# Patient Record
Sex: FEMALE | Race: WHITE | NOT HISPANIC OR LATINO | Employment: PART TIME | URBAN - METROPOLITAN AREA
[De-identification: names, ages, dates, MRNs, and addresses within clinical notes are randomized per-mention and may not be internally consistent; named-entity substitution may affect disease eponyms.]

---

## 2021-06-18 ENCOUNTER — APPOINTMENT (OUTPATIENT)
Dept: LAB | Facility: MEDICAL CENTER | Age: 56
End: 2021-06-18
Payer: COMMERCIAL

## 2021-06-18 DIAGNOSIS — Z00.00 ROUTINE GENERAL MEDICAL EXAMINATION AT A HEALTH CARE FACILITY: ICD-10-CM

## 2021-06-18 LAB — RUBV IGG SERPL IA-ACNC: >175 IU/ML

## 2021-06-18 PROCEDURE — 86735 MUMPS ANTIBODY: CPT

## 2021-06-18 PROCEDURE — 86787 VARICELLA-ZOSTER ANTIBODY: CPT

## 2021-06-18 PROCEDURE — 86762 RUBELLA ANTIBODY: CPT

## 2021-06-18 PROCEDURE — 36415 COLL VENOUS BLD VENIPUNCTURE: CPT

## 2021-06-18 PROCEDURE — 86765 RUBEOLA ANTIBODY: CPT

## 2021-06-19 LAB — RUBV IGM SER IA-ACNC: <20 AU/ML (ref 0–19.9)

## 2021-06-21 LAB
MEV IGM SER-ACNC: <0.91 ISR (ref 0–0.9)
VZV IGM SER IA-ACNC: <0.91 INDEX (ref 0–0.9)

## 2021-06-22 LAB
MEV IGG SER QL: NORMAL
MUV IGG SER QL: NORMAL
MUV IGM SER QL: <0.8 AU (ref 0–0.79)
VZV IGG SER IA-ACNC: NORMAL

## 2021-06-26 ENCOUNTER — APPOINTMENT (OUTPATIENT)
Dept: LAB | Facility: MEDICAL CENTER | Age: 56
End: 2021-06-26
Payer: COMMERCIAL

## 2021-06-26 DIAGNOSIS — Z00.8 HEALTH EXAMINATION IN POPULATION SURVEY: ICD-10-CM

## 2021-06-26 LAB — HBV SURFACE AB SER-ACNC: <3.1 MIU/ML

## 2021-06-26 PROCEDURE — 36415 COLL VENOUS BLD VENIPUNCTURE: CPT

## 2021-06-26 PROCEDURE — 86706 HEP B SURFACE ANTIBODY: CPT

## 2021-12-08 ENCOUNTER — APPOINTMENT (OUTPATIENT)
Dept: URGENT CARE | Facility: CLINIC | Age: 56
End: 2021-12-08

## 2021-12-08 ENCOUNTER — APPOINTMENT (OUTPATIENT)
Dept: LAB | Facility: CLINIC | Age: 56
End: 2021-12-08

## 2021-12-08 DIAGNOSIS — Z02.1 PHYSICAL EXAM, PRE-EMPLOYMENT: Primary | ICD-10-CM

## 2021-12-08 PROCEDURE — 36415 COLL VENOUS BLD VENIPUNCTURE: CPT

## 2021-12-08 PROCEDURE — 86480 TB TEST CELL IMMUN MEASURE: CPT

## 2021-12-10 LAB
GAMMA INTERFERON BACKGROUND BLD IA-ACNC: 0.04 IU/ML
M TB IFN-G BLD-IMP: NEGATIVE
M TB IFN-G CD4+ BCKGRND COR BLD-ACNC: 0 IU/ML
M TB IFN-G CD4+ BCKGRND COR BLD-ACNC: 0 IU/ML
MITOGEN IGNF BCKGRD COR BLD-ACNC: 9.28 IU/ML

## 2022-01-11 ENCOUNTER — CLINICAL SUPPORT (OUTPATIENT)
Dept: FAMILY MEDICINE CLINIC | Facility: MEDICAL CENTER | Age: 57
End: 2022-01-11

## 2022-01-11 DIAGNOSIS — Z20.822 EXPOSURE TO COVID-19 VIRUS: Primary | ICD-10-CM

## 2022-01-11 LAB
SARS-COV-2 AG UPPER RESP QL IA: POSITIVE
VALID CONTROL: ABNORMAL

## 2022-01-11 PROCEDURE — NC001 PR NO CHARGE

## 2022-01-11 NOTE — PROGRESS NOTES
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  Patient came to the office today for a rapid covid swab  The result was positive  The patient was made aware of the results and advised that the patient will need to isolate for a minimum of 5 days from symptom onset  Patient may discontinue isolation after 5 days if they are fever free for 24 hours without the need for medication and if they have symptom improvement for 24 hours  There will however be mandatory masking in public and at work for the next 5 days following isolation discontinuation  Daily temperature checks are recommended  If chest pain or shortness of breath develops patient should go to the emergency room  Patient is agreeable and understands instructions  Will call back when they are feeling better to set up a new patient appointment to establish with Dr Jennifer Porter

## 2022-11-09 ENCOUNTER — APPOINTMENT (OUTPATIENT)
Dept: RADIOLOGY | Facility: MEDICAL CENTER | Age: 57
End: 2022-11-09

## 2022-11-09 ENCOUNTER — OFFICE VISIT (OUTPATIENT)
Dept: URGENT CARE | Facility: MEDICAL CENTER | Age: 57
End: 2022-11-09

## 2022-11-09 ENCOUNTER — TELEPHONE (OUTPATIENT)
Dept: OBGYN CLINIC | Facility: HOSPITAL | Age: 57
End: 2022-11-09

## 2022-11-09 VITALS
TEMPERATURE: 97.6 F | WEIGHT: 126.4 LBS | RESPIRATION RATE: 18 BRPM | HEIGHT: 63 IN | HEART RATE: 76 BPM | SYSTOLIC BLOOD PRESSURE: 145 MMHG | OXYGEN SATURATION: 99 % | DIASTOLIC BLOOD PRESSURE: 89 MMHG | BODY MASS INDEX: 22.39 KG/M2

## 2022-11-09 DIAGNOSIS — S69.92XA HAND INJURY, LEFT, INITIAL ENCOUNTER: ICD-10-CM

## 2022-11-09 DIAGNOSIS — S62.639A CLOSED AVULSION FRACTURE OF DISTAL PHALANX OF FINGER, INITIAL ENCOUNTER: Primary | ICD-10-CM

## 2022-11-09 NOTE — PROGRESS NOTES
[facility]        NAME: Bri Williamson is a 61 y o  female  : 1965    MRN: [id number]  DATE: 2022  TIME: 11:29 AM    Assessment and Orders   Closed avulsion fracture of distal phalanx of finger, initial encounter [L49 [id number]]  1  Closed avulsion fracture of distal phalanx of finger, initial encounter  Ambulatory Referral to Orthopedic Surgery   2  Hand injury, left, initial encounter  XR hand 3+ vw left         Plan and Discussion      Symptoms and exam consistent with avulsion fracture of the 5th digit on the left hand  Finger splinted in neutral position  Referral made to Orthopedics for follow-up  Risks and benefits discussed  Patient understands and agrees with the plan  Follow up with PCP  Chief Complaint     Chief Complaint   Patient presents with   • Hand Injury     Pt slammed her finger off a table while wrestling her dog x7 days ago         History of Present Illness       HPI     Patient is a 76-year-old female who presents with left 5th digit pain  She states that approximately 9 days ago, she was wrestling with a dog and she hit her left hand on a table  She states that her finger was corrected initially but she was able to reduce it herself immediately  Since then, she has kept it splinted but has had continued pain  Today she is here for evaluation and x-ray  Review of Systems   Review of Systems   Musculoskeletal: Positive for joint swelling  Skin: Negative for color change  Neurological: Negative for weakness, light-headedness and numbness  Current Medications     No current outpatient medications on file      Current Allergies     Allergies as of 2022 - Reviewed 2022   Allergen Reaction Noted   • Sulfa antibiotics Hives 2022            The following portions of the patient's history were reviewed and updated as appropriate: allergies, current medications, past family history, past medical history, past social history, past surgical history and problem list      History reviewed  No pertinent past medical history  Past Surgical History:   Procedure Laterality Date   • TENDON REPAIR         No family history on file  Medications have been verified  Objective   /89 (BP Location: Left arm)   Pulse 76   Temp 97 6 °F (36 4 °C) (Temporal)   Resp 18   Ht 5' 3" (1 6 m)   Wt 57 3 kg (126 lb 6 4 oz)   SpO2 99%   BMI 22 39 kg/m²   No LMP recorded  Patient is postmenopausal        Physical Exam     Physical Exam  Constitutional:       General: She is not in acute distress  Appearance: She is not ill-appearing  HENT:      Head: Normocephalic and atraumatic  Right Ear: External ear normal       Left Ear: External ear normal       Nose: Nose normal    Cardiovascular:      Rate and Rhythm: Normal rate and regular rhythm  Pulmonary:      Effort: Pulmonary effort is normal  No respiratory distress  Musculoskeletal:        Hands:    Skin:     General: Skin is warm and dry  Neurological:      General: No focal deficit present  Mental Status: She is alert and oriented to person, place, and time  Psychiatric:         Mood and Affect: Mood normal          Behavior: Behavior normal          Thought Content:  Thought content normal          Judgment: Judgment normal                Saida Lam DO

## 2022-11-09 NOTE — TELEPHONE ENCOUNTER
Hello,  Please advise if the following patient can be forced onto the schedule:    Pa Marcos    : 63    DAVID:[id number]    Call back #: [phone]    Insurance: [employer]    Reason for appointment:Closed avulsion fracture of distal phalanx of finger    Requested doctor/location: [place]      Thank you

## 2022-11-16 ENCOUNTER — OFFICE VISIT (OUTPATIENT)
Dept: OBGYN CLINIC | Facility: CLINIC | Age: 57
End: 2022-11-16

## 2022-11-16 VITALS
SYSTOLIC BLOOD PRESSURE: 110 MMHG | BODY MASS INDEX: 22.86 KG/M2 | HEIGHT: 63 IN | WEIGHT: 129 LBS | DIASTOLIC BLOOD PRESSURE: 73 MMHG | HEART RATE: 70 BPM

## 2022-11-16 DIAGNOSIS — S62.639A CLOSED AVULSION FRACTURE OF DISTAL PHALANX OF FINGER, INITIAL ENCOUNTER: ICD-10-CM

## 2022-11-16 NOTE — PROGRESS NOTES
Assessment/Plan:  Assessment/Plan   Diagnoses and all orders for this visit:    Closed avulsion fracture of distal phalanx of finger, initial encounter  -     Ambulatory Referral to Orthopedic Surgery      50-year-old female with onset of left little finger pain and swelling from injury on 11/02/2022  Discussed with patient physical exam, radiographs, impression and plan  X-rays left hand noted for displaced fracture dorsal base of the 5th distal phalanx  Physical exam left little finger noted for swelling at the dorsum DIP joint  She is tenderness at the joint  She is flexion at the joint limited to 30° and she has active extension at the joint to -5°  There is no appreciable collateral ligament laxity  She is intact neurovascularly  Clinical impression is that she is symptomatic from acute fracture to the distal phalanx  I discussed with patient that she has intact extensor mechanism so we may do conservative management  I recommend she continue with static splint immobilization for another 2 weeks  She will follow up in 2 weeks at which point we will repeat x-rays of left little finger and she will be re-evaluated  Subjective:   Patient ID: Freya Buenrostro is a 61 y o  female  Chief Complaint   Patient presents with   • Left Little Finger - Pain, Swelling       50-year-old female presents for evaluation of left little finger pain from injury on 11/02/2022  She was playing with her dog when she thinks she may have struck her hand against a table  She would pain described as sudden in onset, localized to the distal aspect the finger, non radiating, associated with swelling, and worse with moving  She presented to Urgent Care about 1 week later at which point x-ray evaluation was noted for fracture of the distal phalanx  She was placed in aluminum splint and advised to follow up with orthopedic care    She reports that while she is in resting position she is little to no pain, but upon palpation of distal aspect finger there is pain  Hand Pain  This is a new problem  The current episode started 1 to 4 weeks ago  The problem occurs daily  The problem has been gradually improving  Associated symptoms include arthralgias and joint swelling  Pertinent negatives include no abdominal pain, chest pain, chills, fever, numbness, rash, sore throat or weakness  Exacerbated by: Direct pressure  She has tried rest and immobilization for the symptoms  The treatment provided mild relief  The following portions of the patient's history were reviewed and updated as appropriate: She  has no past medical history on file  She  has a past surgical history that includes Tendon repair  Her family history is not on file  She  reports that she has never smoked  She has never used smokeless tobacco  No history on file for alcohol use and drug use  She is allergic to sulfa antibiotics       Review of Systems   Constitutional: Negative for chills and fever  HENT: Negative for sore throat  Eyes: Negative for visual disturbance  Respiratory: Negative for shortness of breath  Cardiovascular: Negative for chest pain  Gastrointestinal: Negative for abdominal pain  Genitourinary: Negative for flank pain  Musculoskeletal: Positive for arthralgias and joint swelling  Skin: Negative for rash and wound  Neurological: Negative for weakness and numbness  Hematological: Does not bruise/bleed easily  Psychiatric/Behavioral: Negative for self-injury  Objective:  Vitals:    11/16/22 0858   BP: 110/73   Pulse: 70   Weight: 58 5 kg (129 lb)   Height: 5' 3" (1 6 m)     Left Hand Exam     Muscle Strength   Wrist extension: 5/5   Wrist flexion: 5/5   :  4/5     Other   Sensation: normal  Pulse: present    Comments:   left little finger noted for swelling at the dorsum DIP joint  She is tenderness at the joint    She is flexion at the joint limited to 30° and she has active extension at the joint to -5°  There is no appreciable collateral ligament laxity  Strength/Myotome Testing     Left Wrist/Hand   Wrist extension: 5  Wrist flexion: 5      Physical Exam  Vitals and nursing note reviewed  Constitutional:       General: She is not in acute distress  Appearance: She is well-developed  She is not ill-appearing or diaphoretic  HENT:      Head: Normocephalic  Right Ear: External ear normal       Left Ear: External ear normal    Eyes:      Conjunctiva/sclera: Conjunctivae normal    Neck:      Trachea: No tracheal deviation  Cardiovascular:      Rate and Rhythm: Normal rate  Pulmonary:      Effort: Pulmonary effort is normal  No respiratory distress  Abdominal:      General: There is no distension  Musculoskeletal:         General: Swelling, tenderness and signs of injury present  No deformity  Skin:     General: Skin is warm and dry  Coloration: Skin is not jaundiced or pale  Neurological:      Mental Status: She is alert and oriented to person, place, and time  Psychiatric:         Mood and Affect: Mood and affect and mood normal          Behavior: Behavior normal          Thought Content: Thought content normal          Judgment: Judgment normal          I have personally reviewed pertinent films in PACS and my interpretation is  X-rays left hand noted for displaced fracture dorsal base of the 5th distal phalanx  Gracie Ordonez

## 2022-11-30 ENCOUNTER — APPOINTMENT (OUTPATIENT)
Dept: RADIOLOGY | Facility: CLINIC | Age: 57
End: 2022-11-30

## 2022-11-30 ENCOUNTER — OFFICE VISIT (OUTPATIENT)
Dept: OBGYN CLINIC | Facility: CLINIC | Age: 57
End: 2022-11-30

## 2022-11-30 VITALS
BODY MASS INDEX: 22.86 KG/M2 | HEART RATE: 67 BPM | HEIGHT: 63 IN | WEIGHT: 129 LBS | SYSTOLIC BLOOD PRESSURE: 123 MMHG | DIASTOLIC BLOOD PRESSURE: 73 MMHG

## 2022-11-30 DIAGNOSIS — S62.639A CLOSED AVULSION FRACTURE OF DISTAL PHALANX OF FINGER, INITIAL ENCOUNTER: ICD-10-CM

## 2022-11-30 DIAGNOSIS — S62.639A CLOSED AVULSION FRACTURE OF DISTAL PHALANX OF FINGER, INITIAL ENCOUNTER: Primary | ICD-10-CM

## 2022-11-30 NOTE — PROGRESS NOTES
Assessment/Plan:  Assessment/Plan   Diagnoses and all orders for this visit:    Closed avulsion fracture of distal phalanx of finger, initial encounter  -     XR finger left fifth digit-ayah; Future  -     Ambulatory Referral to Hand Surgery; Future        59-year-old female with left little finger pain from injury 11/02/2022  Discussed with patient physical exam, radiographs, impression and plan  X-rays of the left little finger noted for persistence of avulsion fracture dorsal base of the 5th distal phalanx  Physical exam noted for swelling dorsum of the 5th digit DIP joint with flexion attitude  She has normal flexion at the joint with active extension limited to -10 degrees, however full passive extension  There is no appreciable collateral ligament laxity  Clinical impression is that she is still symptomatic from avulsion fracture  Morphology of her injury and still being unable to actively fully extend the digit I recommend she be evaluated by hand specialist for further recommendation  She will follow up with me as needed  Subjective:   Patient ID: Kelsy Davidson is a 61 y o  female  Chief Complaint   Patient presents with   • Left Hand - Follow-up       59-year-old female following up for left little finger pain from injury 11/02/2022  She was last seen by me 2 weeks ago which point she was assessed have fracture and advised to continue with static splint  Reports feeling much better  She reports having pain described as localized to the 5th digit DIP joint, achy and sore, worse with direct pressure on the joint, associated with swelling and limited range of motion, and improved resting  Reports being able to bend the finger at the joint however being unable to fully extend actively at the DIP joint  Hand Pain  This is a new problem  The current episode started more than 1 month ago  The problem occurs daily  The problem has been gradually improving   Associated symptoms include arthralgias and joint swelling  Pertinent negatives include no numbness or weakness  Exacerbated by: Direct pressure  She has tried rest and immobilization for the symptoms  The treatment provided mild relief  Review of Systems   Musculoskeletal: Positive for arthralgias and joint swelling  Neurological: Negative for weakness and numbness  Objective:  Vitals:    11/30/22 1023   BP: 123/73   Pulse: 67   Weight: 58 5 kg (129 lb)   Height: 5' 3" (1 6 m)     Left Hand Exam     Muscle Strength   The patient has normal left wrist strength  Comments:   swelling dorsum of the 5th digit DIP joint with flexion attitude  She has normal flexion at the joint with active extension limited to -10 degrees, however full passive extension  There is no appreciable collateral ligament laxity  Strength/Myotome Testing     Left Wrist/Hand   Normal wrist strength      Physical Exam  Vitals and nursing note reviewed  Constitutional:       General: She is not in acute distress  Appearance: She is well-developed  She is not ill-appearing  HENT:      Head: Normocephalic  Right Ear: External ear normal       Left Ear: External ear normal    Eyes:      Conjunctiva/sclera: Conjunctivae normal    Neck:      Trachea: No tracheal deviation  Cardiovascular:      Rate and Rhythm: Normal rate  Pulmonary:      Effort: Pulmonary effort is normal  No respiratory distress  Abdominal:      General: There is no distension  Musculoskeletal:         General: Swelling and tenderness present  No deformity or signs of injury  Skin:     General: Skin is warm and dry  Coloration: Skin is not jaundiced or pale  Neurological:      Mental Status: She is alert and oriented to person, place, and time  Psychiatric:         Mood and Affect: Mood and affect and mood normal          Behavior: Behavior normal          Thought Content:  Thought content normal          Judgment: Judgment normal          I have personally reviewed pertinent films in PACS and my interpretation is X-rays of the left little finger noted for persistence of avulsion fracture dorsal base of the 5th distal phalanx  Jann Turner

## 2023-01-05 ENCOUNTER — OFFICE VISIT (OUTPATIENT)
Dept: FAMILY MEDICINE CLINIC | Facility: CLINIC | Age: 58
End: 2023-01-05

## 2023-01-05 VITALS
HEIGHT: 63 IN | WEIGHT: 127.8 LBS | BODY MASS INDEX: 22.64 KG/M2 | SYSTOLIC BLOOD PRESSURE: 120 MMHG | OXYGEN SATURATION: 98 % | DIASTOLIC BLOOD PRESSURE: 72 MMHG | HEART RATE: 90 BPM | TEMPERATURE: 97.9 F

## 2023-01-05 DIAGNOSIS — Z12.31 ENCOUNTER FOR SCREENING MAMMOGRAM FOR MALIGNANT NEOPLASM OF BREAST: ICD-10-CM

## 2023-01-05 DIAGNOSIS — Z00.00 ANNUAL PHYSICAL EXAM: Primary | ICD-10-CM

## 2023-01-05 DIAGNOSIS — Z01.84 IMMUNITY STATUS TESTING: ICD-10-CM

## 2023-01-05 DIAGNOSIS — Z12.11 SCREEN FOR COLON CANCER: ICD-10-CM

## 2023-01-05 DIAGNOSIS — Z11.1 SCREENING FOR TUBERCULOSIS: ICD-10-CM

## 2023-01-05 DIAGNOSIS — Z01.419 ENCOUNTER FOR ANNUAL ROUTINE GYNECOLOGICAL EXAMINATION: ICD-10-CM

## 2023-01-05 DIAGNOSIS — Z13.220 SCREENING FOR LIPID DISORDERS: ICD-10-CM

## 2023-01-05 NOTE — PATIENT INSTRUCTIONS

## 2023-01-05 NOTE — PROGRESS NOTES
[facility]ULT [facility]AL [facility]    NAME: Monica Nava  AGE: 61 y o  SEX: female  : 1965     DATE: 2023     Assessment and Plan:     Problem List Items Addressed This Visit        Other    Annual physical exam - Primary    Relevant Orders    Comprehensive metabolic panel    Screening for tuberculosis    Relevant Orders    TB Skin Test (Completed)    Encounter for screening mammogram for malignant neoplasm of breast    Relevant Orders    Mammo screening bilateral w 3d & cad    Screening for lipid disorders    Relevant Orders    Lipid Panel with Direct LDL reflex    Immunity status testing    Relevant Orders    Hepatitis B surface antibody   Other Visit Diagnoses     Encounter for annual routine gynecological examination        Relevant Orders    Ambulatory Referral to Obstetrics / Gynecology    Screen for colon cancer        Relevant Orders    Cologuard          Immunizations and preventive care screenings were discussed with patient today  Appropriate education was printed on patient's after visit summary  Counseling:  Injury prevention: discussed safety/seat belts, safety helmets, smoke detectors, carbon dioxide detectors, and smoking near bedding or upholstery  Exercise: the importance of regular exercise/physical activity was discussed  Recommend exercise 3-5 times per week for at least 30 minutes  Return in 1 year (on 2024)  Chief Complaint:     Chief Complaint   Patient presents with   • Establish Care      History of Present Illness:     Adult Annual Physical   Patient here for a comprehensive physical exam  The patient reports establish care   Patient here to establish care and reports that she needs to have her titers for her PPD and TB Patient reports that dad is diabetic thyroid cancer and sister with thyroid cancer  and mom with COPD and HTN   Diet and Physical Activity  Diet/Nutrition: well balanced diet  Exercise: moderate cardiovascular exercise and 5-7 times a week on average  Depression Screening  PHQ-2/9 Depression Screening    Little interest or pleasure in doing things: 0 - not at all  Feeling down, depressed, or hopeless: 0 - not at all  PHQ-2 Score: 0  PHQ-2 Interpretation: Negative depression screen       General Health  Sleep: sleeps well  Hearing: normal - bilateral   Vision: goes for regular eye exams, most recent eye exam <1 year ago and wears contacts  Dental: regular dental visits  /GYN Health  Patient is: perimenopausal  Last menstrual period: 11/2022  Contraceptive method: none  Review of Systems:     Review of Systems   Constitutional: Negative for activity change, appetite change, chills, diaphoresis, fatigue, fever and unexpected weight change  HENT: Negative for congestion, ear pain, hearing loss, postnasal drip, sinus pressure, sinus pain, sneezing and sore throat  Eyes: Negative for pain, redness and visual disturbance  Respiratory: Negative for cough and shortness of breath  Cardiovascular: Negative for chest pain, palpitations and leg swelling  Gastrointestinal: Negative for abdominal distention, abdominal pain, anal bleeding, blood in stool, constipation, diarrhea, nausea, rectal pain and vomiting  Endocrine: Negative  Genitourinary: Negative  Musculoskeletal: Negative for arthralgias  Skin: Negative  Allergic/Immunologic: Negative  Neurological: Negative for dizziness, light-headedness and headaches  Hematological: Negative  Psychiatric/Behavioral: Negative for behavioral problems and dysphoric mood  Past Medical History:     No past medical history on file     Past Surgical History:     Past Surgical History:   Procedure Laterality Date   • TENDON REPAIR        Social History:     Social History     Socioeconomic History   • Marital status: /Civil Union     Spouse name: None   • Number of children: None   • Years of education: None   • Highest education level: None   Occupational History   • None   Tobacco Use   • Smoking status: Never   • Smokeless tobacco: Never   Substance and Sexual Activity   • Alcohol use: None   • Drug use: None   • Sexual activity: None   Other Topics Concern   • None   Social History Narrative   • None     Social Determinants of Health     Financial Resource Strain: Not on file   Food Insecurity: Not on file   Transportation Needs: Not on file   Physical Activity: Not on file   Stress: Not on file   Social Connections: Not on file   Intimate Partner Violence: Not on file   Housing Stability: Not on file      Family History:     No family history on file  Current Medications:     No current outpatient medications on file  No current facility-administered medications for this visit  Allergies: Allergies   Allergen Reactions   • Sulfa Antibiotics Hives      Physical Exam:     /72 (BP Location: Left arm, Patient Position: Sitting)   Pulse 90   Temp 97 9 °F (36 6 °C) (Temporal)   Ht 5' 3" (1 6 m)   Wt 58 kg (127 lb 12 8 oz)   SpO2 98%   BMI 22 64 kg/m²     Physical Exam  Vitals and nursing note reviewed  Constitutional:       General: She is not in acute distress  Appearance: She is well-developed  HENT:      Head: Normocephalic and atraumatic  Right Ear: Tympanic membrane normal       Left Ear: Tympanic membrane normal       Nose: Nose normal       Mouth/Throat:      Mouth: Mucous membranes are moist    Eyes:      Conjunctiva/sclera: Conjunctivae normal    Cardiovascular:      Rate and Rhythm: Normal rate and regular rhythm  Heart sounds: No murmur heard  Pulmonary:      Effort: Pulmonary effort is normal  No respiratory distress  Breath sounds: Normal breath sounds  Abdominal:      Palpations: Abdomen is soft  Tenderness: There is no abdominal tenderness  Musculoskeletal:         General: No swelling  Cervical back: Neck supple     Skin: General: Skin is warm and dry  Capillary Refill: Capillary refill takes less than 2 seconds  Neurological:      General: No focal deficit present  Mental Status: She is alert and oriented to person, place, and time     Psychiatric:         Mood and Affect: Mood normal           [place]ena [place], [address]

## 2023-01-06 ENCOUNTER — APPOINTMENT (OUTPATIENT)
Dept: LAB | Facility: MEDICAL CENTER | Age: 58
End: 2023-01-06

## 2023-01-06 DIAGNOSIS — Z13.220 SCREENING FOR LIPID DISORDERS: ICD-10-CM

## 2023-01-06 DIAGNOSIS — Z00.00 ANNUAL PHYSICAL EXAM: ICD-10-CM

## 2023-01-06 DIAGNOSIS — Z01.84 IMMUNITY STATUS TESTING: ICD-10-CM

## 2023-01-06 LAB
ALBUMIN SERPL BCP-MCNC: 3.5 G/DL (ref 3.5–5)
ALP SERPL-CCNC: 78 U/L (ref 46–116)
ALT SERPL W P-5'-P-CCNC: 24 U/L (ref 12–78)
ANION GAP SERPL CALCULATED.3IONS-SCNC: 3 MMOL/L (ref 4–13)
AST SERPL W P-5'-P-CCNC: 19 U/L (ref 5–45)
BILIRUB SERPL-MCNC: 0.46 MG/DL (ref 0.2–1)
BUN SERPL-MCNC: 18 MG/DL (ref 5–25)
CALCIUM SERPL-MCNC: 9.2 MG/DL (ref 8.3–10.1)
CHLORIDE SERPL-SCNC: 109 MMOL/L (ref 96–108)
CHOLEST SERPL-MCNC: 186 MG/DL
CO2 SERPL-SCNC: 28 MMOL/L (ref 21–32)
CREAT SERPL-MCNC: 0.77 MG/DL (ref 0.6–1.3)
GFR SERPL CREATININE-BSD FRML MDRD: 85 ML/MIN/1.73SQ M
GLUCOSE P FAST SERPL-MCNC: 89 MG/DL (ref 65–99)
HBV SURFACE AB SER-ACNC: 17.61 MIU/ML
HDLC SERPL-MCNC: 65 MG/DL
LDLC SERPL CALC-MCNC: 110 MG/DL (ref 0–100)
POTASSIUM SERPL-SCNC: 4.3 MMOL/L (ref 3.5–5.3)
PROT SERPL-MCNC: 6.9 G/DL (ref 6.4–8.4)
SODIUM SERPL-SCNC: 140 MMOL/L (ref 135–147)
TRIGL SERPL-MCNC: 57 MG/DL

## 2023-01-11 ENCOUNTER — APPOINTMENT (OUTPATIENT)
Dept: RADIOLOGY | Facility: AMBULARY SURGERY CENTER | Age: 58
End: 2023-01-11
Attending: SURGERY

## 2023-01-11 ENCOUNTER — OFFICE VISIT (OUTPATIENT)
Dept: OBGYN CLINIC | Facility: CLINIC | Age: 58
End: 2023-01-11

## 2023-01-11 VITALS
WEIGHT: 128 LBS | SYSTOLIC BLOOD PRESSURE: 127 MMHG | HEIGHT: 63 IN | HEART RATE: 67 BPM | DIASTOLIC BLOOD PRESSURE: 80 MMHG | BODY MASS INDEX: 22.68 KG/M2

## 2023-01-11 DIAGNOSIS — S62.639A CLOSED AVULSION FRACTURE OF DISTAL PHALANX OF FINGER, INITIAL ENCOUNTER: Primary | ICD-10-CM

## 2023-01-11 DIAGNOSIS — M65.311 TRIGGER THUMB OF RIGHT HAND: ICD-10-CM

## 2023-01-11 DIAGNOSIS — S62.639A CLOSED FRACTURE OF DISTAL PHALANX OF FINGER WITH MALLET DEFORMITY, INITIAL ENCOUNTER: ICD-10-CM

## 2023-01-11 DIAGNOSIS — M20.019 CLOSED FRACTURE OF DISTAL PHALANX OF FINGER WITH MALLET DEFORMITY, INITIAL ENCOUNTER: ICD-10-CM

## 2023-01-11 DIAGNOSIS — S62.639A CLOSED AVULSION FRACTURE OF DISTAL PHALANX OF FINGER, INITIAL ENCOUNTER: ICD-10-CM

## 2023-01-11 RX ORDER — DEXAMETHASONE SODIUM PHOSPHATE 10 MG/ML
40 INJECTION, SOLUTION INTRAMUSCULAR; INTRAVENOUS
Status: COMPLETED | OUTPATIENT
Start: 2023-01-11 | End: 2023-01-11

## 2023-01-11 RX ORDER — AMOXICILLIN AND CLAVULANATE POTASSIUM 875; 125 MG/1; MG/1
TABLET, FILM COATED ORAL
COMMUNITY
Start: 2023-01-09

## 2023-01-11 RX ORDER — LIDOCAINE HYDROCHLORIDE 10 MG/ML
1 INJECTION, SOLUTION INFILTRATION; PERINEURAL
Status: COMPLETED | OUTPATIENT
Start: 2023-01-11 | End: 2023-01-11

## 2023-01-11 RX ADMIN — LIDOCAINE HYDROCHLORIDE 1 ML: 10 INJECTION, SOLUTION INFILTRATION; PERINEURAL at 09:37

## 2023-01-11 RX ADMIN — DEXAMETHASONE SODIUM PHOSPHATE 40 MG: 10 INJECTION, SOLUTION INTRAMUSCULAR; INTRAVENOUS at 09:37

## 2023-01-11 NOTE — PROGRESS NOTES
Kiley SINGH  Attending, Orthopaedic Surgery  Hand, Wrist, and Elbow Surgery  [facility]      ORTHOPAEDIC HAND, WRIST, AND ELBOW OFFICE  VISIT       ASSESSMENT/PLAN:      61 y o  female with a left small finger bony mallet, DOI 11/2/22 and right trigger thumb     The etiology of above diagnosis was discussed along with treatment options  We discussed hyperextension splinting 100 percent of the time may improve her mallet deformity, but as she is aprox  2 months out from injury, this may not be beneficial for her  She notes that the DIP joint droop is not bothersome for her and she has full finger flexion  She does not wish to try hyperextension splinting at this time  The decision was made to proceed with an initial right trigger thumb CSI  The CSI was performed in the office without complication  Post injection protocol/expectations were reviewed  The CSI can be repeated in 6 weeks time if the 1st CSI was beneficial for her  Follow up in 6 weeks time for re-evaluation  The patient verbalized understanding of exam findings and treatment plan  We engaged in the shared decision-making process and treatment options were discussed at length with the patient  Surgical and conservative management discussed today along with risks and benefits  Diagnoses and all orders for this visit:    Closed avulsion fracture of distal phalanx of finger, initial encounter  -     Ambulatory Referral to Hand Surgery  -     XR finger left fifth digit-pinkie; Future    Closed fracture of distal phalanx of finger with mallet deformity, initial encounter    Trigger thumb of right hand  -     Hand/upper extremity injection: R thumb A1    Other orders  -     amoxicillin-clavulanate (AUGMENTIN) 875-125 mg per tablet; Take by mouth      Follow Up:  Return in about 6 weeks (around 2/22/2023)  To Do Next Visit:  Re-evaluation of current issue      General Discussions:  Mallet Finger:  The anatomy and physiology of a mallet finger was discussed with the patient today  Typically, the extensor tendon is torn off of the dorsal aspect of the distal phalanx  This results in a flexed posture of the distal interphalangeal joint, with incomplete extension (ie  A drooped finger)  Typically this is treated through conservative measures  An extension block splint is worn over the distal interphalangeal joint for 6-8 weeks continuously, followed by 4-6 weeks of nocturnal use  After healing, there is typically a small flexion deformity at the distal interphalangeal joint  Surgery does not typically change these results  Trigger Finger: The anatomy and physiology of trigger finger was discussed with the patient today in the office  Edema and increased contact pressure within the flexor tendons at the A1 pulley can cause pain, crepitation, and triggering or locking of the digit resulting in limitation of function  Symptoms can occur at anytime but are typically worse in the morning or after a brief rest from repetitive activity  Treatment options include resting/nighttime MP blocking splints to decrease edema, oral anti-inflammatory medications, home or formal therapy exercises, up to 2 steroid injections within the tendon sheath, or surgical release  While majority of patients do respond to conservative treatment, up to 20% may require surgical release        ____________________________________________________________________________________________________________________________________________      CHIEF COMPLAINT:  No chief complaint on file  SUBJECTIVE:  Belel Conley is a 61y o  year old RHD female who presents to the office today for a left small finger bony mallet, DOI 11/2/22  I am seeing Leslye Kyle in consultation at the request of Dr Kevon Raygoza  She notes around 11/2/22 she was getting a toy from a dog when she believes that she hit her left small finger on a table   She presented to Urgent care on 11/9/22, at which time x-rays were performed and she was provided with an aluminum finger splint  She did follow up with Dr Rodriguez Kong  She has been wearing the splint but notes that it was difficult to keep on  She notes mild pain to her left small finger DIP joint  She also notes over the past 2 weeks, her right thumb has began to click, catch and lock  She notes pain to her right thumb A1 pulley  She does have to use the other hand to unlock the right thumb  Pain/symptom timing:  Worse during the day when active  Pain/symptom context:  Worse with activites and work  Pain/symptom modifying factors:  Rest makes better, activities make worse  Pain/symptom associated signs/symptoms: none    Prior treatment   · NSAIDsNot Asked   · Injections No   · Bracing/Orthotics No    Physical Therapy Yes     I have personally reviewed all the relevant PMH, PSH, SH, FH, Medications and allergies      PAST MEDICAL HISTORY:  History reviewed  No pertinent past medical history  PAST SURGICAL HISTORY:  Past Surgical History:   Procedure Laterality Date   • TENDON REPAIR         FAMILY HISTORY:  History reviewed  No pertinent family history  SOCIAL HISTORY:  Social History     Tobacco Use   • Smoking status: Never   • Smokeless tobacco: Never       MEDICATIONS:    Current Outpatient Medications:   •  amoxicillin-clavulanate (AUGMENTIN) 875-125 mg per tablet, Take by mouth, Disp: , Rfl:     ALLERGIES:  Allergies   Allergen Reactions   • Sulfa Antibiotics Hives           REVIEW OF SYSTEMS:  Review of Systems   Constitutional: Negative for chills, fever and unexpected weight change  HENT: Negative for hearing loss, nosebleeds and sore throat  Eyes: Negative for pain, redness and visual disturbance  Respiratory: Negative for cough, shortness of breath and wheezing  Cardiovascular: Negative for chest pain, palpitations and leg swelling  Gastrointestinal: Negative for abdominal pain, nausea and vomiting     Endocrine: Negative for polydipsia and polyuria  Genitourinary: Negative for difficulty urinating and hematuria  Musculoskeletal: Negative for arthralgias, joint swelling and myalgias  Skin: Negative for rash and wound  Neurological: Negative for dizziness, numbness and headaches  Psychiatric/Behavioral: Negative for decreased concentration, dysphoric mood and suicidal ideas  The patient is not nervous/anxious  VITALS:  Vitals:    01/11/23 0907   BP: 127/80   Pulse: 67       LABS:  HgA1c: No results found for: HGBA1C  BMP:   Lab Results   Component Value Date    CALCIUM 9 2 01/06/2023    K 4 3 01/06/2023    CO2 28 01/06/2023     (H) 01/06/2023    BUN 18 01/06/2023    CREATININE 0 77 01/06/2023       _____________________________________________________  PHYSICAL EXAMINATION:  General: well developed and well nourished, alert, oriented times 3 and appears comfortable  Psychiatric: Normal  HEENT: Normocephalic, Atraumatic Trachea Midline, No torticollis  Pulmonary: No audible wheezing or respiratory distress   Abdomen/GI: Non tender, non distended   Cardiovascular: No pitting edema, 2+ radial pulse   Skin: No masses, erythema, lacerations, fluctation, ulcerations  Neurovascular: Sensation Intact to the Median, Ulnar, Radial Nerve, Motor Intact to the Median, Ulnar, Radial Nerve and Pulses Intact  Musculoskeletal: Normal, except as noted in detailed exam and in HPI  MUSCULOSKELETAL EXAMINATION:    Left small finger:  No erythema, ecchymosis or edema  DIP joint droop due to mallet deformity   Unable to full extend at the DIP joint   Mild DIP joint tenderness   Full composite fist   Brisk capillary refill       Right thumb:  Positive palpable nodule over the A1 pulley  Positive tenderness to palpation over A1 pulley  Positive catching   Positive clicking       ___________________________________________________  STUDIES REVIEWED:  I have personally reviewed AP lateral and oblique radiographs of left small finger   which demonstrate improved alignment of bony mallet from injury films however flexion deformity remains          PROCEDURES PERFORMED:  Hand/upper extremity injection: R thumb A1  Universal Protocol:  Consent: Verbal consent obtained  Written consent not obtained  Risks and benefits: risks, benefits and alternatives were discussed  Consent given by: patient  Time out: Immediately prior to procedure a "time out" was called to verify the correct patient, procedure, equipment, support staff and site/side marked as required    Site marked: the operative site was marked  Patient identity confirmed: verbally with patient    Supporting Documentation  Indications: pain   Procedure Details  Condition:trigger finger Location: thumb - R thumb A1   Preparation: Patient was prepped and draped in the usual sterile fashion  Needle size: 25 G  Ultrasound guidance: no  Medications administered: 1 mL lidocaine 1 %; 40 mg dexamethasone 100 mg/10 mL    Patient tolerance: patient tolerated the procedure well with no immediate complications  Dressing:  Sterile dressing applied            _____________________________________________________      Scribe Attestation    I,:  Radha Starks am acting as a scribe while in the presence of the attending physician :       I,:  Prateek Shahid MD personally performed the services described in this documentation    as scribed in my presence :

## 2023-02-22 ENCOUNTER — OFFICE VISIT (OUTPATIENT)
Dept: OBGYN CLINIC | Facility: CLINIC | Age: 58
End: 2023-02-22

## 2023-02-22 VITALS
BODY MASS INDEX: 22.15 KG/M2 | HEIGHT: 63 IN | SYSTOLIC BLOOD PRESSURE: 102 MMHG | DIASTOLIC BLOOD PRESSURE: 67 MMHG | WEIGHT: 125 LBS | HEART RATE: 68 BPM

## 2023-02-22 DIAGNOSIS — S62.639A CLOSED FRACTURE OF DISTAL PHALANX OF FINGER WITH MALLET DEFORMITY, INITIAL ENCOUNTER: ICD-10-CM

## 2023-02-22 DIAGNOSIS — M65.311 TRIGGER THUMB OF RIGHT HAND: Primary | ICD-10-CM

## 2023-02-22 DIAGNOSIS — M20.019 CLOSED FRACTURE OF DISTAL PHALANX OF FINGER WITH MALLET DEFORMITY, INITIAL ENCOUNTER: ICD-10-CM

## 2023-02-22 DIAGNOSIS — Z01.812 ENCOUNTER FOR PRE-OPERATIVE LABORATORY TESTING: ICD-10-CM

## 2023-02-22 NOTE — H&P
ASSESSMENT/PLAN:      58-year-old female here for her right thumb trigger finger  Patient had her first injection 6 weeks ago for the right thumb trigger that did not give her any significant relief  Patient wishes to discuss surgical intervention at this time  The anatomy and physiology of trigger finger was discussed with the patient today in the office  Edema and increased contact pressure within the flexor tendons at the A1 pulley can cause pain, crepitation, and limitation of function  Treatment options include resting MP blocking splints to decrease edema, oral anti-inflammatory medications, home or formal therapy exercises, up to 2 steroid injections within the tendon sheath, or surgical release  While majority of patients do respond to conservative treatment, up to 20% may require surgical release  This is an outpatient procedure where the hand will be wrapped up for 5 days postoperatively  Encourage to use the thumb  After 5 days the dressing will come off, can wash with warm water, soap and pat dry but encouraged to not submerge  Sutures will come out between 10-14 days  Patient wishes to proceed with the surgery  She is currently in school and wishes to do this in August    We will follow up post op  The patient verbalized understanding of exam findings and treatment plan  We engaged in the shared decision-making process and treatment options were discussed at length with the patient  Surgical and conservative management discussed today along with risks and benefits  Diagnoses and all orders for this visit:    Trigger thumb of right hand  -     Case request operating room: RELEASE TRIGGER FINGER; Standing  -     Case request operating room: RELEASE TRIGGER FINGER    Closed fracture of distal phalanx of finger with mallet deformity, initial encounter    Encounter for pre-operative laboratory testing  -     Basic metabolic panel; Future  -     EKG 12 lead;  Future    Other orders  - Nursing Communication Warmimg Interventions Implemented; Standing  -     Nursing Communication CHG bath, have staff wash entire body (neck down) per pre-op bathing protocol  Routine, evening prior to, and day of surgery ; Standing  -     Nursing Communication Swab both nares with Povidone-Iodine solution, EXCLUDE if patient has shellfish/Iodine allergy, and replace with nasal alcohol swabstick  Routine, day of surgery, on call to OR; Standing  -     Void on call to OR; Standing  -     Insert peripheral IV; Standing  -     Diet NPO; Sips with meds; Standing  -     Apply Sequential Compression Device; Standing          Trigger Finger Release: The anatomy and physiology of trigger finger was discussed with the patient today in the office  Edema and increased contact pressure within the flexor tendons at the A1 pulley can cause pain, crepitation, and limitation of function  Treatment options include resting MP blocking splints to decrease edema, oral anti-inflammatory medications, home or formal therapy exercises, up to 2 steroid injections or surgical release  While majority of patients do respond to conservative treatment, up to 20% may require surgical release  The patient has elected release of the trigger finger  The patient has elected to undergo a release of the A1 pulley (trigger finger)  A small incision will be made over the palmar aspect of the hand, the tendon sheath holding the flexor tendons will be released  In the postoperative period, light activities are allowed immediately, driving is allowed when narcotic medication has stopped, and the incision may get wet after 2 days  Heavy activities (lifting more than approximately 10 pounds) will be allowed after the follow up appointment in 1-2 weeks  While the pain and discomfort within the wrist typically improves rapidly, some residual discomfort may be present for up to 6 weeks    The nodule that is typically palpable in the palmar aspect of the hand will not be removed, as this would necessitate removal of a portion of the flexor tendon, however the catching, clicking, and locking should resolve  Approximate success rate is 98%  The risks and benefits of the procedure were explained to the patient, which include, but are not limited to: Bleeding, infection, recurrence, pain, scar, damage to tendons, damage to nerves, and damage to blood vessels, need for future surgery and complications related to anesthesia  If bony work is done, risks also include malunion and nonunion  These risks, along with alternative conservative treatment options, and postoperative protocols were voiced back and understood by the patient  All questions were answered to the patient's satisfaction  The patient agrees to comply with a standard postoperative protocol, and is willing to proceed  Education was provided via written and auditory forms  There were no barriers to learning  Written handouts regarding wound care, incision and scar care, and general preoperative information, as well as risks and benefits were provided to the patient  Follow Up:  Return for Follow up post- op  To Do Next Visit:  Re-evaluation of current issue    ____________________________________________________________________________________________________________________________________________      CHIEF COMPLAINT:  Chief Complaint   Patient presents with   • Right Hand - Clicking     Thumb       SUBJECTIVE:  Maryuri Leon is a 61y o  year old RHD female who presents today for a 6 week follow up for her left small finger bony mallet, 11/2/22 and right trigger thumb  At her last visit, a right trigger thumb injection was performed on 1/11/23, which did not give her any relief  Her bony mallet of the small finger is achy today  I have personally reviewed all the relevant PMH, PSH, SH, FH, Medications and allergies       PAST MEDICAL HISTORY:  Past Medical History:   Diagnosis Date • Ear problems    • Tinnitus        PAST SURGICAL HISTORY:  Past Surgical History:   Procedure Laterality Date   • TENDON REPAIR         FAMILY HISTORY:  History reviewed  No pertinent family history  SOCIAL HISTORY:  Social History     Tobacco Use   • Smoking status: Never   • Smokeless tobacco: Never       MEDICATIONS:    Current Outpatient Medications:   •  amoxicillin-clavulanate (AUGMENTIN) 875-125 mg per tablet, Take by mouth, Disp: , Rfl:   •  meclizine (ANTIVERT) 25 mg tablet, TAKE 1 TABELT BY MOUTH THREE TIMES A DAY AS NEEDED FOR VERTIGO (Patient not taking: Reported on 2/2/2023), Disp: , Rfl:     ALLERGIES:  Allergies   Allergen Reactions   • Sulfa Antibiotics Hives       REVIEW OF SYSTEMS:  Review of Systems   Constitutional: Negative for chills, fever and unexpected weight change  HENT: Negative for hearing loss, nosebleeds and sore throat  Eyes: Negative for pain, redness and visual disturbance  Respiratory: Negative for cough, shortness of breath and wheezing  Cardiovascular: Negative for chest pain, palpitations and leg swelling  Gastrointestinal: Negative for abdominal pain and nausea  Genitourinary: Negative for dyspareunia, dysuria and frequency  Musculoskeletal: Positive for joint swelling  Skin: Negative for rash and wound  Neurological: Negative for dizziness, numbness and headaches  Psychiatric/Behavioral: Negative for decreased concentration and suicidal ideas  The patient is not nervous/anxious          VITALS:  Vitals:    02/22/23 0856   BP: 102/67   Pulse: 68       LABS:  HgA1c: No results found for: HGBA1C  BMP:   Lab Results   Component Value Date    CALCIUM 9 2 01/06/2023    K 4 3 01/06/2023    CO2 28 01/06/2023     (H) 01/06/2023    BUN 18 01/06/2023    CREATININE 0 77 01/06/2023       _____________________________________________________  PHYSICAL EXAMINATION:  General: well developed and well nourished, alert, oriented times 3 and appears comfortable  Psychiatric: Normal  HEENT: Normocephalic, Atraumatic Trachea Midline, No torticollis  Pulmonary: No audible wheezing or respiratory distress   Cardiovascular: No pitting edema, 2+ radial pulse   Abdominal/GI: abdomen non tender, non distended   Skin: No masses, erythema, lacerations, fluctation, ulcerations  Neurovascular: Sensation Intact to the Median, Ulnar, Radial Nerve, Motor Intact to the Median, Ulnar, Radial Nerve and Pulses Intact  Musculoskeletal: Normal, except as noted in detailed exam and in HPI  MUSCULOSKELETAL EXAMINATION:    right thumb finger:  Positive palpable nodule over the A1 pulley  Positive tenderness to palpation over A1 pulley  Negative catching   Positive clicking       ___________________________________________________  STUDIES REVIEWED:  No images were reviewed at today's visit      PROCEDURES PERFORMED:  Procedures  No Procedures performed today    _____________________________________________________      Pérez Blinks    I,:  Gregorio Samuels am acting as a scribe while in the presence of the attending physician :       I,:  Maryan Levnie MD personally performed the services described in this documentation    as scribed in my presence :

## 2023-02-22 NOTE — PROGRESS NOTES
ASSESSMENT/PLAN:      65-year-old female here for her right thumb trigger finger  Patient had her first injection 6 weeks ago for the right thumb trigger that did not give her any significant relief  Patient wishes to discuss surgical intervention at this time  The anatomy and physiology of trigger finger was discussed with the patient today in the office  Edema and increased contact pressure within the flexor tendons at the A1 pulley can cause pain, crepitation, and limitation of function  Treatment options include resting MP blocking splints to decrease edema, oral anti-inflammatory medications, home or formal therapy exercises, up to 2 steroid injections within the tendon sheath, or surgical release  While majority of patients do respond to conservative treatment, up to 20% may require surgical release  This is an outpatient procedure where the hand will be wrapped up for 5 days postoperatively  Encourage to use the thumb  After 5 days the dressing will come off, can wash with warm water, soap and pat dry but encouraged to not submerge  Sutures will come out between 10-14 days  Patient wishes to proceed with the surgery  She is currently in school and wishes to do this in August    We will follow up post op  The patient verbalized understanding of exam findings and treatment plan  We engaged in the shared decision-making process and treatment options were discussed at length with the patient  Surgical and conservative management discussed today along with risks and benefits  Diagnoses and all orders for this visit:    Trigger thumb of right hand  -     Case request operating room: RELEASE TRIGGER FINGER; Standing  -     Case request operating room: RELEASE TRIGGER FINGER    Closed fracture of distal phalanx of finger with mallet deformity, initial encounter    Encounter for pre-operative laboratory testing  -     Basic metabolic panel; Future  -     EKG 12 lead;  Future    Other orders  - Nursing Communication Warmimg Interventions Implemented; Standing  -     Nursing Communication CHG bath, have staff wash entire body (neck down) per pre-op bathing protocol  Routine, evening prior to, and day of surgery ; Standing  -     Nursing Communication Swab both nares with Povidone-Iodine solution, EXCLUDE if patient has shellfish/Iodine allergy, and replace with nasal alcohol swabstick  Routine, day of surgery, on call to OR; Standing  -     Void on call to OR; Standing  -     Insert peripheral IV; Standing  -     Diet NPO; Sips with meds; Standing  -     Apply Sequential Compression Device; Standing          Trigger Finger Release: The anatomy and physiology of trigger finger was discussed with the patient today in the office  Edema and increased contact pressure within the flexor tendons at the A1 pulley can cause pain, crepitation, and limitation of function  Treatment options include resting MP blocking splints to decrease edema, oral anti-inflammatory medications, home or formal therapy exercises, up to 2 steroid injections or surgical release  While majority of patients do respond to conservative treatment, up to 20% may require surgical release  The patient has elected release of the trigger finger  The patient has elected to undergo a release of the A1 pulley (trigger finger)  A small incision will be made over the palmar aspect of the hand, the tendon sheath holding the flexor tendons will be released  In the postoperative period, light activities are allowed immediately, driving is allowed when narcotic medication has stopped, and the incision may get wet after 2 days  Heavy activities (lifting more than approximately 10 pounds) will be allowed after the follow up appointment in 1-2 weeks  While the pain and discomfort within the wrist typically improves rapidly, some residual discomfort may be present for up to 6 weeks    The nodule that is typically palpable in the palmar aspect of the hand will not be removed, as this would necessitate removal of a portion of the flexor tendon, however the catching, clicking, and locking should resolve  Approximate success rate is 98%  The risks and benefits of the procedure were explained to the patient, which include, but are not limited to: Bleeding, infection, recurrence, pain, scar, damage to tendons, damage to nerves, and damage to blood vessels, need for future surgery and complications related to anesthesia  If bony work is done, risks also include malunion and nonunion  These risks, along with alternative conservative treatment options, and postoperative protocols were voiced back and understood by the patient  All questions were answered to the patient's satisfaction  The patient agrees to comply with a standard postoperative protocol, and is willing to proceed  Education was provided via written and auditory forms  There were no barriers to learning  Written handouts regarding wound care, incision and scar care, and general preoperative information, as well as risks and benefits were provided to the patient  Follow Up:  Return for Follow up post- op  To Do Next Visit:  Re-evaluation of current issue    ____________________________________________________________________________________________________________________________________________      CHIEF COMPLAINT:  Chief Complaint   Patient presents with   • Right Hand - Clicking     Thumb       SUBJECTIVE:  Austin Palacio is a 61y o  year old RHD female who presents today for a 6 week follow up for her left small finger bony mallet, 11/2/22 and right trigger thumb  At her last visit, a right trigger thumb injection was performed on 1/11/23, which did not give her any relief  Her bony mallet of the small finger is achy today  I have personally reviewed all the relevant PMH, PSH, SH, FH, Medications and allergies       PAST MEDICAL HISTORY:  Past Medical History:   Diagnosis Date • Ear problems    • Tinnitus        PAST SURGICAL HISTORY:  Past Surgical History:   Procedure Laterality Date   • TENDON REPAIR         FAMILY HISTORY:  History reviewed  No pertinent family history  SOCIAL HISTORY:  Social History     Tobacco Use   • Smoking status: Never   • Smokeless tobacco: Never       MEDICATIONS:    Current Outpatient Medications:   •  amoxicillin-clavulanate (AUGMENTIN) 875-125 mg per tablet, Take by mouth, Disp: , Rfl:   •  meclizine (ANTIVERT) 25 mg tablet, TAKE 1 TABELT BY MOUTH THREE TIMES A DAY AS NEEDED FOR VERTIGO (Patient not taking: Reported on 2/2/2023), Disp: , Rfl:     ALLERGIES:  Allergies   Allergen Reactions   • Sulfa Antibiotics Hives       REVIEW OF SYSTEMS:  Review of Systems   Constitutional: Negative for chills, fever and unexpected weight change  HENT: Negative for hearing loss, nosebleeds and sore throat  Eyes: Negative for pain, redness and visual disturbance  Respiratory: Negative for cough, shortness of breath and wheezing  Cardiovascular: Negative for chest pain, palpitations and leg swelling  Gastrointestinal: Negative for abdominal pain and nausea  Genitourinary: Negative for dyspareunia, dysuria and frequency  Musculoskeletal: Positive for joint swelling  Skin: Negative for rash and wound  Neurological: Negative for dizziness, numbness and headaches  Psychiatric/Behavioral: Negative for decreased concentration and suicidal ideas  The patient is not nervous/anxious          VITALS:  Vitals:    02/22/23 0856   BP: 102/67   Pulse: 68       LABS:  HgA1c: No results found for: HGBA1C  BMP:   Lab Results   Component Value Date    CALCIUM 9 2 01/06/2023    K 4 3 01/06/2023    CO2 28 01/06/2023     (H) 01/06/2023    BUN 18 01/06/2023    CREATININE 0 77 01/06/2023       _____________________________________________________  PHYSICAL EXAMINATION:  General: well developed and well nourished, alert, oriented times 3 and appears comfortable  Psychiatric: Normal  HEENT: Normocephalic, Atraumatic Trachea Midline, No torticollis  Pulmonary: No audible wheezing or respiratory distress   Cardiovascular: No pitting edema, 2+ radial pulse   Abdominal/GI: abdomen non tender, non distended   Skin: No masses, erythema, lacerations, fluctation, ulcerations  Neurovascular: Sensation Intact to the Median, Ulnar, Radial Nerve, Motor Intact to the Median, Ulnar, Radial Nerve and Pulses Intact  Musculoskeletal: Normal, except as noted in detailed exam and in HPI  MUSCULOSKELETAL EXAMINATION:    right thumb finger:  Positive palpable nodule over the A1 pulley  Positive tenderness to palpation over A1 pulley  Negative catching   Positive clicking       ___________________________________________________  STUDIES REVIEWED:  No images were reviewed at today's visit      PROCEDURES PERFORMED:  Procedures  No Procedures performed today    _____________________________________________________      Puneet Carlos    I,:  Christin Alas am acting as a scribe while in the presence of the attending physician :       I,:  Keyonna Almonte MD personally performed the services described in this documentation    as scribed in my presence :

## 2023-03-06 PROBLEM — Z13.220 SCREENING FOR LIPID DISORDERS: Status: RESOLVED | Noted: 2023-01-05 | Resolved: 2023-03-06

## 2023-03-06 PROBLEM — Z11.1 SCREENING FOR TUBERCULOSIS: Status: RESOLVED | Noted: 2023-01-05 | Resolved: 2023-03-06

## 2023-04-28 ENCOUNTER — TELEPHONE (OUTPATIENT)
Dept: ADMINISTRATIVE | Facility: OTHER | Age: 58
End: 2023-04-28

## 2023-04-28 NOTE — PROGRESS NOTES
04/28/23 9:09 AM    The patient was called and reminded about the open Cologuard order  Instructed to call the ordering provider's office if there are any questions about completing the CRC screen  Thank you    RAMONA WILLIAMSON [facility]

## 2023-08-21 NOTE — H&P
ASSESSMENT/PLAN:       50-year-old female here for her right thumb trigger finger. Patient had her first injection 6 weeks ago for the right thumb trigger that did not give her any significant relief. Patient wishes to discuss surgical intervention at this time. The anatomy and physiology of trigger finger was discussed with the patient today in the office. Edema and increased contact pressure within the flexor tendons at the A1 pulley can cause pain, crepitation, and limitation of function. Treatment options include resting MP blocking splints to decrease edema, oral anti-inflammatory medications, home or formal therapy exercises, up to 2 steroid injections within the tendon sheath, or surgical release. While majority of patients do respond to conservative treatment, up to 20% may require surgical release. This is an outpatient procedure where the hand will be wrapped up for 5 days postoperatively. Encourage to use the thumb. After 5 days the dressing will come off, can wash with warm water, soap and pat dry but encouraged to not submerge. Sutures will come out between 10-14 days. Patient wishes to proceed with the surgery. She is currently in school and wishes to do this in August.   We will follow up post op.      The patient verbalized understanding of exam findings and treatment plan. We engaged in the shared decision-making process and treatment options were discussed at length with the patient. Surgical and conservative management discussed today along with risks and benefits.     Diagnoses and all orders for this visit:     Trigger thumb of right hand  -     Case request operating room: RELEASE TRIGGER FINGER; Standing  -     Case request operating room: RELEASE TRIGGER FINGER     Closed fracture of distal phalanx of finger with mallet deformity, initial encounter     Encounter for pre-operative laboratory testing  -     Basic metabolic panel; Future  -     EKG 12 lead;  Future     Other orders  - Nursing Communication Warmimg Interventions Implemented; Standing  -     Nursing Communication CHG bath, have staff wash entire body (neck down) per pre-op bathing protocol. Routine, evening prior to, and day of surgery.; Standing  -     Nursing Communication Swab both nares with Povidone-Iodine solution, EXCLUDE if patient has shellfish/Iodine allergy, and replace with nasal alcohol swabstick. Routine, day of surgery, on call to OR; Standing  -     Void on call to OR; Standing  -     Insert peripheral IV; Standing  -     Diet NPO; Sips with meds; Standing  -     Apply Sequential Compression Device; Standing             Trigger Finger Release: The anatomy and physiology of trigger finger was discussed with the patient today in the office. Edema and increased contact pressure within the flexor tendons at the A1 pulley can cause pain, crepitation, and limitation of function. Treatment options include resting MP blocking splints to decrease edema, oral anti-inflammatory medications, home or formal therapy exercises, up to 2 steroid injections or surgical release. While majority of patients do respond to conservative treatment, up to 20% may require surgical release. The patient has elected release of the trigger finger. The patient has elected to undergo a release of the A1 pulley (trigger finger). A small incision will be made over the palmar aspect of the hand, the tendon sheath holding the flexor tendons will be released. In the postoperative period, light activities are allowed immediately, driving is allowed when narcotic medication has stopped, and the incision may get wet after 2 days. Heavy activities (lifting more than approximately 10 pounds) will be allowed after the follow up appointment in 1-2 weeks. While the pain and discomfort within the wrist typically improves rapidly, some residual discomfort may be present for up to 6 weeks.   The nodule that is typically palpable in the palmar aspect of the hand will not be removed, as this would necessitate removal of a portion of the flexor tendon, however the catching, clicking, and locking should resolve. Approximate success rate is 98%. The risks and benefits of the procedure were explained to the patient, which include, but are not limited to: Bleeding, infection, recurrence, pain, scar, damage to tendons, damage to nerves, and damage to blood vessels, need for future surgery and complications related to anesthesia. If bony work is done, risks also include malunion and nonunion. These risks, along with alternative conservative treatment options, and postoperative protocols were voiced back and understood by the patient. All questions were answered to the patient's satisfaction. The patient agrees to comply with a standard postoperative protocol, and is willing to proceed. Education was provided via written and auditory forms. There were no barriers to learning. Written handouts regarding wound care, incision and scar care, and general preoperative information, as well as risks and benefits were provided to the patient.     Follow Up:  Return for Follow up post- op.        To Do Next Visit:  Re-evaluation of current issue     ____________________________________________________________________________________________________________________________________________        CHIEF COMPLAINT:       Chief Complaint   Patient presents with   • Right Hand - Clicking       Thumb         SUBJECTIVE:  Vilma Louis is a 61y.o. year old RHD female who presents today for a 6 week follow up for her left small finger bony mallet, 11/2/22 and right trigger thumb. At her last visit, a right trigger thumb injection was performed on 1/11/23, which did not give her any relief.    Her bony mallet of the small finger is achy today.      I have personally reviewed all the relevant PMH, PSH, SH, FH, Medications and allergies.      PAST MEDICAL HISTORY:       Past Medical History:   Diagnosis Date   • Ear problems     • Tinnitus           PAST SURGICAL HISTORY:        Past Surgical History:   Procedure Laterality Date   • TENDON REPAIR             FAMILY HISTORY:  History reviewed. No pertinent family history.     SOCIAL HISTORY:  Social History           Tobacco Use   • Smoking status: Never   • Smokeless tobacco: Never         MEDICATIONS:     Current Outpatient Medications:   •  amoxicillin-clavulanate (AUGMENTIN) 875-125 mg per tablet, Take by mouth, Disp: , Rfl:   •  meclizine (ANTIVERT) 25 mg tablet, TAKE 1 TABELT BY MOUTH THREE TIMES A DAY AS NEEDED FOR VERTIGO (Patient not taking: Reported on 2/2/2023), Disp: , Rfl:      ALLERGIES:       Allergies   Allergen Reactions   • Sulfa Antibiotics Hives         REVIEW OF SYSTEMS:  Review of Systems   Constitutional: Negative for chills, fever and unexpected weight change. HENT: Negative for hearing loss, nosebleeds and sore throat. Eyes: Negative for pain, redness and visual disturbance. Respiratory: Negative for cough, shortness of breath and wheezing. Cardiovascular: Negative for chest pain, palpitations and leg swelling. Gastrointestinal: Negative for abdominal pain and nausea. Genitourinary: Negative for dyspareunia, dysuria and frequency. Musculoskeletal: Positive for joint swelling. Skin: Negative for rash and wound. Neurological: Negative for dizziness, numbness and headaches. Psychiatric/Behavioral: Negative for decreased concentration and suicidal ideas.  The patient is not nervous/anxious.          VITALS:      Vitals:     02/22/23 0856   BP: 102/67   Pulse: 68         LABS:  HgA1c: No results found for: HGBA1C  BMP:         Lab Results   Component Value Date     CALCIUM 9.2 01/06/2023     K 4.3 01/06/2023     CO2 28 01/06/2023      (H) 01/06/2023     BUN 18 01/06/2023     CREATININE 0.77 01/06/2023         _____________________________________________________  PHYSICAL EXAMINATION:  General: well developed and well nourished, alert, oriented times 3 and appears comfortable  Psychiatric: Normal  HEENT: Normocephalic, Atraumatic Trachea Midline, No torticollis  Pulmonary: No audible wheezing or respiratory distress   Cardiovascular: No pitting edema, 2+ radial pulse   Abdominal/GI: abdomen non tender, non distended   Skin: No masses, erythema, lacerations, fluctation, ulcerations  Neurovascular: Sensation Intact to the Median, Ulnar, Radial Nerve, Motor Intact to the Median, Ulnar, Radial Nerve and Pulses Intact  Musculoskeletal: Normal, except as noted in detailed exam and in HPI.        MUSCULOSKELETAL EXAMINATION:     right thumb finger:  Positive palpable nodule over the A1 pulley. Positive tenderness to palpation over A1 pulley. Negative catching.  Positive clicking.       ___________________________________________________  STUDIES REVIEWED:  No images were reviewed at today's visit        PROCEDURES PERFORMED:  Procedures  No Procedures performed today

## 2023-08-22 ENCOUNTER — HOSPITAL ENCOUNTER (OUTPATIENT)
Facility: AMBULARY SURGERY CENTER | Age: 58
Setting detail: OUTPATIENT SURGERY
Discharge: HOME | End: 2023-08-22
Attending: SURGERY | Admitting: SURGERY
Payer: COMMERCIAL

## 2023-08-22 VITALS
SYSTOLIC BLOOD PRESSURE: 126 MMHG | HEIGHT: 63 IN | OXYGEN SATURATION: 100 % | HEART RATE: 54 BPM | RESPIRATION RATE: 18 BRPM | DIASTOLIC BLOOD PRESSURE: 68 MMHG | BODY MASS INDEX: 22.32 KG/M2 | TEMPERATURE: 97.5 F | WEIGHT: 126 LBS

## 2023-08-22 DIAGNOSIS — M65.311 TRIGGER THUMB OF RIGHT HAND: Primary | ICD-10-CM

## 2023-08-22 DIAGNOSIS — Z47.89 AFTERCARE FOLLOWING SURGERY OF THE MUSCULOSKELETAL SYSTEM: ICD-10-CM

## 2023-08-22 PROCEDURE — 26055 INCISE FINGER TENDON SHEATH: CPT | Performed by: PHYSICIAN ASSISTANT

## 2023-08-22 PROCEDURE — NC001 PR NO CHARGE: Performed by: SURGERY

## 2023-08-22 PROCEDURE — 26055 INCISE FINGER TENDON SHEATH: CPT | Performed by: SURGERY

## 2023-08-22 RX ORDER — MAGNESIUM HYDROXIDE 1200 MG/15ML
LIQUID ORAL AS NEEDED
Status: DISCONTINUED | OUTPATIENT
Start: 2023-08-22 | End: 2023-08-22 | Stop reason: HOSPADM

## 2023-08-22 RX ORDER — OXYCODONE HYDROCHLORIDE AND ACETAMINOPHEN 5; 325 MG/1; MG/1
1 TABLET ORAL EVERY 4 HOURS PRN
Qty: 5 TABLET | Refills: 0 | Status: SHIPPED | OUTPATIENT
Start: 2023-08-22 | End: 2023-09-01

## 2023-08-22 RX ORDER — HYDROCODONE BITARTRATE AND ACETAMINOPHEN 5; 325 MG/1; MG/1
1 TABLET ORAL EVERY 6 HOURS PRN
Status: DISCONTINUED | OUTPATIENT
Start: 2023-08-22 | End: 2023-08-22 | Stop reason: HOSPADM

## 2023-08-22 NOTE — DISCHARGE INSTR - AVS FIRST PAGE
Post Operative Instructions    You have had surgery on your arm today, please read and follow the information below:  Elevate your hand above your elbow during the next 24-48 hours to help with swelling. Place your hand and arm over your head with motion at your shoulder three times a day. Do not apply any cream/ointment/oil to your incisions including antibiotics. Do not soak your hands in standing water (dishwater, tubs, Jacuzzi's, pools, etc.) until given permission (typically 2-3 weeks after injury)    Call the office if you notice any:  Increased numbness or tingling of your hand or fingers that is not relieved with elevation. Increasing pain that is not controlled with medication. Difficulty chewing, breathing, swallowing. Chest pains or shortness of breath. Fever over 101.4 degrees. Bandage: Please keep bandages clean and dry. Remove bandage after 5 days. Once bandages are removed you may wash hands with soap and water. Short showers are okay as well, but please avoid soaking the hand as described above (ie no pools, baths, dirty dish water, hot tubs, ocean/lake water, etc). Sutures will be removed in the office at your first follow up visit, please do not remove them yourself. Please do NOT put any topical agents on the surgical wound including neosporin, peroxide, tea tree oil, vitamin E, etc. as these can delay wound healing. Motion: Move fingers into a fist 5 times a day, DO NOT move any splinted fingers. Weight bearing status: Avoid heavy lifting (>5 pounds) with the extremity that was operated on until follow up appointment. Normal activities of daily living are OK. Ice: Ice for 10 minutes every hour as needed for swelling x 24 hours. Sling: No sling necessary.     Pain medication:   Naproxen 220 mg two times a day (this is over the counter!)  *do not take this medication if you were told by your doctor that you cannot take anti-inflammatories or NSAIDS  Tylenol Extended Release 650 mg every 8 hours (this is over the counter!)   Norco/Hydrocodone one tab every 6 hours ONLY AS NEEDED for severe pain        Follow-up Appointment: 10-14 days with Dr Teto Hou        Please call the office if you have any questions or concerns regarding your post-operative care.

## 2023-08-22 NOTE — LETTER
[address]  [place] [id number]  Dept: [phone]    August 22, 2023     Patient: Nichole Nguyen   YOB: 1965   Date of Visit: 8/22/2023       To Whom it May Concern:    Arabella Ballesteros is under my professional care. She had surgery on 8/22/2023. She may return to work on 8/24/23 light duty. Restrictions include no lifting, pushing, pulling, or gripping with the right hand. She may perform one-to-one observation and all other tasks that fall within these restrictions. Anticipate these restrictions to apply until she is seen back in our office for her postop evaluation. If you have any questions or concerns, please don't hesitate to call.          Sincerely,          Oni Dumont PA-C

## 2023-08-22 NOTE — INTERVAL H&P NOTE
H&P reviewed. After examining the patient I find no changes in the patients condition since the H&P had been written.  ASA 2

## 2023-08-22 NOTE — OP NOTE
OPERATIVE REPORT  PATIENT NAME: Court Rooney  :  1965  MRN: [id number]  Pt Location: [facility] MAIN OR    SURGERY DATE: 23    Surgeon(s) and Role:     Elsi Hinojosa MD - Primary     * Jay Nichole PA-C - Assisting    Pre-Op Diagnosis:  Trigger thumb of right hand [M65.311]    Post-Op Diagnosis Codes:     * Trigger thumb of right hand [M65.311]    Procedure(s):  THUMB TRIGGER FINGER RELEASE (Right)    Specimen(s):  No specimens collected during this procedure. Estimated Blood Loss:   Minimal    Anesthesia Type:   Local    IMPLANTS:  * No implants in log *    PERIOPERATIVE ANTIBIOTICS:    None     Tourniquet Time: 3 min  at 250 mmHg          Operative Indications: The patient has a history of Trigger Finger  right  thumb that was recalcitrant to conservative management. The decision was made to bring the patient to the operating room for Trigger Finger Release  right  thumb. Risks of the procedure were explained which include, but are not limited to bleeding; infection; damage to nerves, arteries,veins, tendons; scar; pain; need for reoperation; failure to give desired result; and risks of anaesthesia. All questions were answered to satisfaction and they were willing to proceed. Operative Findings:  Hypertrophy A1 pulley       Complications:   None    Procedure and Technique:  After the patient, site, and procedure were identified, the patient was brought into the operating room in a supine position. Local anaesthesia was provided. A well padded tourniquet was applied to the extremity, set at 250 mmHg. The  right upper extremity was then prepped and drapped in a normal, sterile, orthopedic fashion. A  Horizontal incision is made in line with the proximal crease of the right  thumb, overlying the A1 pulley Dissection was  carried down under loupe magnification. Skin and subcutaneous tissues were sharply incised.  The ulnar digital nerve was identified and protected The tissue was elevated off the A1 pulley. The A1 pulley was  identified and incised. There was no retinacular cyst noted. Tenosynovectomy was not carried out. The FPL was noted to have  scuffing at the volar surface       At the completion of the procedure, hemostasis was obtained with cautery and direct pressure. The wounds were copiously irrigated with sterile solution. The wounds were closed with Prolene. Sterile dressings were applied, including Xeroform, gauze, tweeners, webril, ACE. Please note, all sponge, needle, and instrument counts were correct prior to closure. Loupe magnification was utilized. The patient tolerated the procedure well. I was present for the entire procedure., A qualified resident physician was not available. and A physician assistant was required during the procedure for retraction, tissue handling, dissection and suturing.     Patient Disposition:  PACU     SIGNATURE: Eliazar Latham MD  DATE: 08/22/23  TIME: 11:44 AM

## 2023-08-29 ENCOUNTER — APPOINTMENT (OUTPATIENT)
Dept: LAB | Facility: HOSPITAL | Age: 58
End: 2023-08-29

## 2023-08-29 DIAGNOSIS — Z00.8 HEALTH EXAMINATION IN POPULATION SURVEY: ICD-10-CM

## 2023-08-29 LAB
CHOLEST SERPL-MCNC: 214 MG/DL
EST. AVERAGE GLUCOSE BLD GHB EST-MCNC: 120 MG/DL
HBA1C MFR BLD: 5.8 %
HDLC SERPL-MCNC: 73 MG/DL
LDLC SERPL CALC-MCNC: 118 MG/DL (ref 0–100)
NONHDLC SERPL-MCNC: 141 MG/DL
TRIGL SERPL-MCNC: 116 MG/DL

## 2023-08-29 PROCEDURE — 36415 COLL VENOUS BLD VENIPUNCTURE: CPT

## 2023-08-29 PROCEDURE — 80061 LIPID PANEL: CPT

## 2023-08-29 PROCEDURE — 83036 HEMOGLOBIN GLYCOSYLATED A1C: CPT

## 2023-08-31 ENCOUNTER — TELEPHONE (OUTPATIENT)
Dept: OBGYN CLINIC | Facility: MEDICAL CENTER | Age: 58
End: 2023-08-31

## 2023-08-31 NOTE — TELEPHONE ENCOUNTER
Caller: PT    Doctor: Nyasia Wynn     Reason for call: PT needs to change her  PO appt on 9/6  Sx date was 8/22/23  Would you please be able to assist with rescheduling this . I did not cancel the one on 9/6.  Thank you     Call back#: [phone]

## 2023-09-06 ENCOUNTER — OFFICE VISIT (OUTPATIENT)
Dept: OBGYN CLINIC | Facility: CLINIC | Age: 58
End: 2023-09-06

## 2023-09-06 VITALS — DIASTOLIC BLOOD PRESSURE: 80 MMHG | SYSTOLIC BLOOD PRESSURE: 128 MMHG | HEART RATE: 82 BPM

## 2023-09-06 DIAGNOSIS — M65.311 TRIGGER THUMB OF RIGHT HAND: Primary | ICD-10-CM

## 2023-09-06 PROCEDURE — 99024 POSTOP FOLLOW-UP VISIT: CPT | Performed by: SURGERY

## 2023-09-06 NOTE — PROGRESS NOTES
Assessment/Plan:  Patient ID: Juli Dobbins 61 y.o. female   Surgery: Thumb Trigger Finger Release - Right  Date of Surgery: 8/22/2023    Sutures taken out today. Small seroma noted but we don't treat these any differently. Can start performing scar massage over incision. No submerging until suture poke holes seal up. Can wash with warm water soap and pat dry. Work not provided released full duty no restrictions starting tomorrow. Follow Up:  PRN    To Do Next Visit:  Re-evaluate      CHIEF COMPLAINT:  Chief Complaint   Patient presents with   • Right Thumb - Post-op         SUBJECTIVE:  Juli Dobbins is a 61y.o. year old female who presents for follow up after Thumb Trigger Finger Release - Right. Today patient has Stiffness/LROM at the MP joint.     PHYSICAL EXAMINATION:  General: well developed and well nourished, alert, oriented times 3 and appears comfortable  Psychiatric: Normal    MUSCULOSKELETAL EXAMINATION:  Incision: Clean, dry, intact, healed and suture(s) intact   Surgery Site: normal, no evidence of infection  and swelling present  Range of Motion: As expected, opposition intact and full composite fist possible  Neurovascular status: Neuro intact, good cap refill and radial pulse 2+  Activity Restrictions: No restrictions  Done today: Sutures out      STUDIES REVIEWED:  No Studies to review      PROCEDURES PERFORMED:  Procedures  No Procedures performed today    Scribe Attestation    I,:  Nabila Graves am acting as a scribe while in the presence of the attending physician.:       I,:  Liliana Davis MD personally performed the services described in this documentation    as scribed in my presence.:

## 2023-09-06 NOTE — LETTER
September 6, 2023     Patient: Orlando Jenkins  YOB: 1965  Date of Visit: 9/6/2023      To Whom it May Concern:    Sugar Montelongo is under my professional care. Timmy Cisneros was seen in my office on 9/6/2023. Timmy Cisneros may return to work on 9/7/2023 full duty, no restrictions . If you have any questions or concerns, please don't hesitate to call.          Sincerely,          Lee Rice MD        CC: No Recipients

## 2023-09-07 LAB — COLOGUARD RESULT REPORTABLE: NORMAL

## 2024-10-04 ENCOUNTER — OFFICE VISIT (OUTPATIENT)
Dept: URGENT CARE | Facility: MEDICAL CENTER | Age: 59
End: 2024-10-04
Payer: COMMERCIAL

## 2024-10-04 VITALS
TEMPERATURE: 98.1 F | RESPIRATION RATE: 18 BRPM | OXYGEN SATURATION: 99 % | HEART RATE: 70 BPM | SYSTOLIC BLOOD PRESSURE: 121 MMHG | DIASTOLIC BLOOD PRESSURE: 71 MMHG

## 2024-10-04 DIAGNOSIS — H10.32 ACUTE BACTERIAL CONJUNCTIVITIS OF LEFT EYE: Primary | ICD-10-CM

## 2024-10-04 PROCEDURE — 99213 OFFICE O/P EST LOW 20 MIN: CPT | Performed by: FAMILY MEDICINE

## 2024-10-04 RX ORDER — CIPROFLOXACIN HYDROCHLORIDE 3.5 MG/ML
1 SOLUTION/ DROPS TOPICAL
Qty: 5 ML | Refills: 0 | Status: SHIPPED | OUTPATIENT
Start: 2024-10-04

## 2024-10-04 NOTE — PROGRESS NOTES
[facility]        NAME: Regine Singh is a 59 y.o. female  : 1965    MRN: [id number]  DATE: 2024  TIME: 10:53 AM    Assessment and Plan   Acute bacterial conjunctivitis of left eye [H10.32]  1. Acute bacterial conjunctivitis of left eye  ciprofloxacin (CILOXAN) 0.3 % ophthalmic solution    treated with cipro ophthalmic.   she is a contact lens wearer who does not have glasses so we discussed not using contacts if possible.        Discussed increased risk of resistant bacteria given contact lens use.  If not improving in 48 hours or if worsens or if vision changes, should see ophthalmology asap.    Patient Instructions       Follow up with PCP in 3-5 days.  Proceed to  ER if symptoms worsen.    If tests have been performed at [facility], our office will contact you with results if changes need to be made to the care plan discussed with you at the visit.  You can review your full results on [facility].    Chief Complaint     Chief Complaint   Patient presents with    Conjunctivitis     Patient states this morning she woke up with left eye drainage, crusting, closed shut; recently moved in with grandchildren in house          History of Present Illness       Conjunctivitis   The current episode started today. The onset was sudden. The problem has been gradually worsening. The problem is moderate. Nothing relieves the symptoms. Associated symptoms include eye itching, eye discharge, eye pain and eye redness. Pertinent negatives include no orthopnea, no fever, no decreased vision, no double vision, no photophobia, no abdominal pain, no constipation, no diarrhea, no nausea, no vomiting, no congestion, no ear discharge, no ear pain, no headaches, no hearing loss, no mouth sores, no rhinorrhea, no sore throat, no stridor, no swollen glands, no muscle aches, no neck pain, no neck stiffness, no cough, no URI, no wheezing and no rash. The eye pain is mild. Both (left <right) eyes are  affected.       Review of Systems   Review of Systems   Constitutional:  Negative for fever.   HENT:  Negative for congestion, ear discharge, ear pain, hearing loss, mouth sores, rhinorrhea and sore throat.    Eyes:  Positive for pain, discharge, redness and itching. Negative for double vision and photophobia.   Respiratory:  Negative for cough, wheezing and stridor.    Cardiovascular:  Negative for orthopnea.   Gastrointestinal:  Negative for abdominal pain, constipation, diarrhea, nausea and vomiting.   Musculoskeletal:  Negative for neck pain.   Skin:  Negative for rash.   Neurological:  Negative for headaches.         Current Medications       Current Outpatient Medications:     ciprofloxacin (CILOXAN) 0.3 % ophthalmic solution, Administer 1 drop to both eyes every 2 (two) hours While awake for 24 hours and then every 4 hours, Disp: 5 mL, Rfl: 0    Current Allergies     Allergies as of 10/04/2024 - Reviewed 10/04/2024   Allergen Reaction Noted    Sulfa antibiotics Hives 11/09/2022            The following portions of the patient's history were reviewed and updated as appropriate: allergies, current medications, past family history, past medical history, past social history, past surgical history and problem list.     Past Medical History:   Diagnosis Date    Ear problems     Tinnitus        Past Surgical History:   Procedure Laterality Date    [place] TENDON SHEATH INCISION Right 8/22/2023    Procedure: THUMB TRIGGER FINGER RELEASE;  Surgeon: Gutierrez Bajwa MD;  Location: [facility] MAIN OR;  Service: Orthopedics    TENDON REPAIR         No family history on file.      Medications have been verified.        Objective   /71   Pulse 70   Temp 98.1 °F (36.7 °C) (Temporal)   Resp 18   SpO2 99%   No LMP recorded. Patient is postmenopausal.       Physical Exam     Physical Exam  Vitals reviewed.   Constitutional:       Appearance: Normal appearance.   HENT:      Head: Normocephalic and atraumatic.   Eyes:       General: Lids are normal. Vision grossly intact. Gaze aligned appropriately.         Right eye: No discharge.         Left eye: Discharge present.     Extraocular Movements: Extraocular movements intact.      Conjunctiva/sclera:      Right eye: Right conjunctiva is injected.      Left eye: Left conjunctiva is injected.      Pupils: Pupils are equal, round, and reactive to light.   Neurological:      Mental Status: She is alert.

## 2025-05-15 ENCOUNTER — OFFICE VISIT (OUTPATIENT)
Dept: FAMILY MEDICINE CLINIC | Facility: CLINIC | Age: 60
End: 2025-05-15
Payer: COMMERCIAL

## 2025-05-15 VITALS
TEMPERATURE: 98.3 F | OXYGEN SATURATION: 98 % | WEIGHT: 130 LBS | BODY MASS INDEX: 23.04 KG/M2 | DIASTOLIC BLOOD PRESSURE: 84 MMHG | HEIGHT: 63 IN | HEART RATE: 72 BPM | SYSTOLIC BLOOD PRESSURE: 123 MMHG

## 2025-05-15 DIAGNOSIS — Z12.31 ENCOUNTER FOR SCREENING MAMMOGRAM FOR MALIGNANT NEOPLASM OF BREAST: ICD-10-CM

## 2025-05-15 DIAGNOSIS — Z13.220 SCREENING FOR LIPID DISORDERS: ICD-10-CM

## 2025-05-15 DIAGNOSIS — Z13.1 SCREENING FOR DIABETES MELLITUS: ICD-10-CM

## 2025-05-15 DIAGNOSIS — Z12.11 SCREEN FOR COLON CANCER: ICD-10-CM

## 2025-05-15 DIAGNOSIS — Z00.00 HEALTH MAINTENANCE EXAMINATION: Primary | ICD-10-CM

## 2025-05-15 PROCEDURE — 99396 PREV VISIT EST AGE 40-64: CPT | Performed by: PHYSICIAN ASSISTANT

## 2025-05-15 NOTE — PROGRESS NOTES
Adult Annual Physical  Name: Regine Singh      : 1965      MRN: [id number]  Encounter Provider: Darío Macias PA-C  Encounter Date: 5/15/2025   Encounter department: [facility]    :  Assessment & Plan  Health maintenance examination  Hx reviewed and updated, unremarkable exam, screenings/labs/guidance as below. Annual follow ups, earlier prn       Encounter for screening mammogram for malignant neoplasm of breast    Orders:  •  Mammo screening bilateral w 3d and cad; Future    Screen for colon cancer    Orders:  •  Ambulatory Referral to Gastroenterology; Future    Screening for diabetes mellitus    Orders:  •  Hemoglobin A1C; Future    Screening for lipid disorders    Orders:  •  Lipid Panel with Direct LDL reflex; Future        Preventive Screenings:  - Diabetes Screening: orders placed  - Cholesterol Screening: orders placed   - Hepatitis C screening: screening not indicated   - HIV screening: screening not indicated   - Cervical cancer screening: orders placed   - Breast cancer screening: orders placed   - Colon cancer screening: orders placed   - Lung cancer screening: screening not indicated     Counseling/Anticipatory Guidance:  - Alcohol: discussed moderation in alcohol intake and recommendations for healthy alcohol use.   - Drug use: discussed harms of illicit drug use and how it can negatively impact mental/physical health.   - Tobacco use: discussed harms of tobacco use and management options for quitting.   - Dental health: discussed importance of regular tooth brushing, flossing, and dental visits.   - Diet: discussed recommendations for a healthy/well-balanced diet.   - Exercise: the importance of regular exercise/physical activity was discussed. Recommend exercise 3-5 times per week for at least 30 minutes.          History of Present Illness     Adult Annual Physical:  Patient presents for annual physical. Pt presents for annual physical  No acute concers.   Due  "for PAP, CRC screening, mammography, labs  FH reviewed and updated. Non smoker. No abuse/misuse of alcohol or illicit drugs.   No daily medications. .     Diet and Physical Activity:  - Diet/Nutrition: well balanced diet, low fat diet, consuming 3-5 servings of fruits/vegetables daily and adequate fiber intake.  - Exercise: walking, vigorous cardiovascular exercise, strength training exercises, 5-7 times a week on average and 30-60 minutes on average. I've been doing structured strength training and cardio since 2018    Depression Screening:  - PHQ-2 Score: 0    General Health:  - Sleep: 4-6 hours of sleep on average.  - Hearing: decreased hearing bilateral ears.  - Vision: most recent eye exam < 1 year ago and wears contacts.  - Dental: regular dental visits, brushes teeth twice daily and floss regularly.    /GYN Health:  - Follows with GYN: no.   - Menopause: postmenopausal.   - Last menstrual cycle: 11/20/2023.   - History of STDs: no  - Contraception: tubal ligation and menopause.      Advanced Care Planning:  - Has an advanced directive?: yes    - Has a durable medical POA?: no      Review of Systems   Constitutional:  Negative for chills, fatigue and fever.   HENT:  Negative for congestion, ear pain, hearing loss, nosebleeds, postnasal drip, rhinorrhea, sinus pressure, sinus pain, sneezing and sore throat.    Eyes:  Negative for pain, discharge, itching and visual disturbance.   Respiratory:  Negative for cough, chest tightness, shortness of breath and wheezing.    Cardiovascular:  Negative for chest pain, palpitations and leg swelling.   Gastrointestinal:  Negative for abdominal pain, blood in stool, constipation, diarrhea, nausea and vomiting.   Genitourinary:  Negative for frequency and urgency.   Neurological:  Negative for dizziness, light-headedness and numbness.         Objective   /84   Pulse 72   Temp 98.3 °F (36.8 °C)   Ht 5' 3\" (1.6 m)   Wt 59 kg (130 lb)   LMP 11/20/2023   SpO2 98%   " BMI 23.03 kg/m²     Physical Exam  Vitals and nursing note reviewed.   Constitutional:       General: She is not in acute distress.     Appearance: She is well-developed.   HENT:      Head: Normocephalic and atraumatic.      Right Ear: Tympanic membrane normal.      Left Ear: Tympanic membrane normal.      Nose: Nose normal.      Mouth/Throat:      Mouth: Mucous membranes are moist.      Pharynx: Oropharynx is clear.     Eyes:      Conjunctiva/sclera: Conjunctivae normal.       Cardiovascular:      Rate and Rhythm: Normal rate and regular rhythm.      Heart sounds: No murmur heard.  Pulmonary:      Effort: Pulmonary effort is normal. No respiratory distress.      Breath sounds: Normal breath sounds. No wheezing, rhonchi or rales.   Abdominal:      Palpations: Abdomen is soft.      Tenderness: There is no abdominal tenderness.     Musculoskeletal:         General: No swelling.      Cervical back: Neck supple.   Lymphadenopathy:      Cervical: No cervical adenopathy.     Skin:     General: Skin is warm and dry.      Capillary Refill: Capillary refill takes less than 2 seconds.     Neurological:      Mental Status: She is alert.     Psychiatric:         Mood and Affect: Mood normal.

## 2025-05-22 ENCOUNTER — APPOINTMENT (OUTPATIENT)
Dept: LAB | Facility: HOSPITAL | Age: 60
End: 2025-05-22
Attending: PHYSICIAN ASSISTANT
Payer: COMMERCIAL

## 2025-05-22 ENCOUNTER — RESULTS FOLLOW-UP (OUTPATIENT)
Dept: FAMILY MEDICINE CLINIC | Facility: CLINIC | Age: 60
End: 2025-05-22

## 2025-05-22 DIAGNOSIS — Z13.1 SCREENING FOR DIABETES MELLITUS: ICD-10-CM

## 2025-05-22 DIAGNOSIS — Z13.220 SCREENING FOR LIPID DISORDERS: ICD-10-CM

## 2025-05-22 LAB
CHOLEST SERPL-MCNC: 167 MG/DL (ref ?–200)
EST. AVERAGE GLUCOSE BLD GHB EST-MCNC: 114 MG/DL
HBA1C MFR BLD: 5.6 %
HDLC SERPL-MCNC: 72 MG/DL
LDLC SERPL CALC-MCNC: 87 MG/DL (ref 0–100)
TRIGL SERPL-MCNC: 41 MG/DL (ref ?–150)

## 2025-05-22 PROCEDURE — 36415 COLL VENOUS BLD VENIPUNCTURE: CPT

## 2025-05-22 PROCEDURE — 83036 HEMOGLOBIN GLYCOSYLATED A1C: CPT

## 2025-05-22 PROCEDURE — 80061 LIPID PANEL: CPT

## 2025-06-26 ENCOUNTER — HOSPITAL ENCOUNTER (OUTPATIENT)
Dept: RADIOLOGY | Facility: MEDICAL CENTER | Age: 60
Discharge: HOME | End: 2025-06-26
Attending: PHYSICIAN ASSISTANT
Payer: COMMERCIAL

## 2025-06-26 VITALS — HEIGHT: 63 IN | BODY MASS INDEX: 23.04 KG/M2 | WEIGHT: 130 LBS

## 2025-06-26 DIAGNOSIS — Z12.31 ENCOUNTER FOR SCREENING MAMMOGRAM FOR MALIGNANT NEOPLASM OF BREAST: ICD-10-CM

## 2025-06-26 PROCEDURE — 77067 SCR MAMMO BI INCL CAD: CPT

## 2025-06-26 PROCEDURE — 77063 BREAST TOMOSYNTHESIS BI: CPT

## 2025-07-18 ENCOUNTER — ANNUAL EXAM (OUTPATIENT)
Dept: OBGYN CLINIC | Facility: MEDICAL CENTER | Age: 60
End: 2025-07-18
Payer: COMMERCIAL

## 2025-07-18 VITALS
SYSTOLIC BLOOD PRESSURE: 118 MMHG | WEIGHT: 128 LBS | DIASTOLIC BLOOD PRESSURE: 70 MMHG | BODY MASS INDEX: 22.68 KG/M2 | HEIGHT: 63 IN

## 2025-07-18 DIAGNOSIS — Z87.311 HISTORY OF FRAGILITY FRACTURE: ICD-10-CM

## 2025-07-18 DIAGNOSIS — Z82.62 FAMILY HISTORY OF OSTEOPOROSIS: ICD-10-CM

## 2025-07-18 DIAGNOSIS — Z01.419 ENCOUNTER FOR ANNUAL ROUTINE GYNECOLOGICAL EXAMINATION: Primary | ICD-10-CM

## 2025-07-18 DIAGNOSIS — R92.8 ABNORMAL MAMMOGRAM: ICD-10-CM

## 2025-07-18 DIAGNOSIS — Z78.0 ENCOUNTER FOR OSTEOPOROSIS SCREENING IN ASYMPTOMATIC POSTMENOPAUSAL PATIENT: ICD-10-CM

## 2025-07-18 DIAGNOSIS — Z12.11 SCREEN FOR COLON CANCER: ICD-10-CM

## 2025-07-18 DIAGNOSIS — Z13.820 ENCOUNTER FOR OSTEOPOROSIS SCREENING IN ASYMPTOMATIC POSTMENOPAUSAL PATIENT: ICD-10-CM

## 2025-07-18 DIAGNOSIS — Z12.4 SCREENING FOR CERVICAL CANCER: ICD-10-CM

## 2025-07-18 PROCEDURE — G0145 SCR C/V CYTO,THINLAYER,RESCR: HCPCS | Performed by: CLINICAL NURSE SPECIALIST

## 2025-07-18 PROCEDURE — G0476 HPV COMBO ASSAY CA SCREEN: HCPCS | Performed by: CLINICAL NURSE SPECIALIST

## 2025-07-18 PROCEDURE — S0610 ANNUAL GYNECOLOGICAL EXAMINA: HCPCS | Performed by: CLINICAL NURSE SPECIALIST

## 2025-07-18 NOTE — PROGRESS NOTES
Name: Regine Singh      : 1965      MRN: [id number]  Encounter Provider: SAMM Melgar  Encounter Date: 2025   Encounter department: [facility] [place]    :  Assessment & Plan  Encounter for annual routine gynecological examination  Annual GYN examination completed today.   Health maintenance reviewed/updated as appropriate  Risk prevention and anticipatory guidance provided including:  Encouraged regular self breast exams and to call with changes   Age related Calcium and vitamin D intake  Dietary and lifestyle recommendations based on her age and weight. body mass index is 22.67 kg/m²..    Tobacco and alcohol use, intervention ordered if applicable.   Condom use for prevention of STI's if sexually active.       Screen for colon cancer  Has referral for colonoscopy        Screening for cervical cancer    Orders:  •  Liquid-based pap, screening    Abnormal mammogram  Most recent mammo last month with asymmetry on left breast. Has f/u imaging scheduled in 10d.        History of fragility fracture    Orders:  •  DXA bone density spine hip and pelvis; Future    Family history of osteoporosis    Orders:  •  DXA bone density spine hip and pelvis; Future    Encounter for osteoporosis screening in asymptomatic postmenopausal patient  Rec early start to dexa scans since mom with severe osteoporosis and pt broke finger with minimal trauma  Orders:  •  DXA bone density spine hip and pelvis; Future            Subjective:      History of Present Illness     Regine Singh is a 59 y.o. female. Here for Gynecologic Exam (Postmenopausal/Pap /Mammo 25 / scheduled /Cologuard 23 neg)    No GYN care in 30 yrs  Pt is postmenopausal. Stopped around age 57.She denies PMB.   She denies any C/O abnormal vaginal discharge or irritation. Denies Urinary complaints initially but with questioning admits to mild stress incontinence breast changes    Sexually active:  "not presently   Spouse passed away last year/sexual activity  Vaginal dryness: denies    She reports she feels safe at home.   Lifestyle/exercise: 6 days,  Dietary calcium/vit D  intake: Adequate    HEALTH MAINTENANCE SCREENINGS:     Previous pap smears and ASCCP screening guidelines have been reviewed.   Last Pap:  Not on file; Next due overdue- collect today  History of abnormal pap: No,   Last mammogram:  06/26/2025  Last Colon Cancer Screening: colonoscopy: Not on file Cologuard:Not on file.       Substance Abuse Screening Completed. See hx and flowsheet.    Review of Systems   Constitutional:  Negative for appetite change, chills, fatigue, fever and unexpected weight change.   HENT: Negative.     Eyes: Negative.    Respiratory:  Negative for chest tightness and shortness of breath.    Cardiovascular:  Negative for chest pain and palpitations.   Gastrointestinal:  Negative for abdominal pain, constipation and vomiting.   Endocrine: Negative for cold intolerance and heat intolerance.   Genitourinary:         As per HPI   Musculoskeletal:  Negative for back pain, joint swelling and neck pain.   Skin:  Negative for color change and rash.   Neurological:  Negative for dizziness, weakness and numbness.   Hematological:  Does not bruise/bleed easily.   Psychiatric/Behavioral: Negative.         The following portions of the patient's history were reviewed and updated as appropriate: allergies, current medications, past family history, past medical history, past social history, past surgical history, and problem list.         Objective   /70 (BP Location: Left arm, Patient Position: Sitting, Cuff Size: Standard)   Ht 5' 3\" (1.6 m)   Wt 58.1 kg (128 lb)   LMP 11/20/2023   BMI 22.67 kg/m²     Physical Exam  Constitutional:       General: She is not in acute distress.     Appearance: Normal appearance.   Genitourinary:      Vulva and rectum normal.      No lesions in the vagina.      Right Labia: No rash or " lesions.     Left Labia: No lesions or rash.     No vaginal discharge, erythema, tenderness or bleeding.        Right Adnexa: not tender and no mass present.     Left Adnexa: not tender and no mass present.     No cervical motion tenderness, discharge or friability.      Uterus is not enlarged or tender.      No urethral prolapse present.      Pelvic exam was performed with patient in the lithotomy position.   Breasts:     Breasts are symmetrical.      Right: No inverted nipple, mass, nipple discharge, skin change or tenderness.      Left: No inverted nipple, mass, nipple discharge, skin change or tenderness.   HENT:      Head: Normocephalic and atraumatic.     Cardiovascular:      Rate and Rhythm: Normal rate.      Heart sounds: No murmur heard.  Pulmonary:      Effort: Pulmonary effort is normal.      Breath sounds: Normal breath sounds.   Abdominal:      General: There is no distension.      Palpations: Abdomen is soft.      Tenderness: There is no abdominal tenderness.     Musculoskeletal:         General: Normal range of motion.      Cervical back: Normal range of motion.   Lymphadenopathy:      Cervical: No cervical adenopathy.     Neurological:      Mental Status: She is alert and oriented to person, place, and time.     Skin:     General: Skin is warm and dry.     Psychiatric:         Mood and Affect: Mood normal.         Behavior: Behavior normal.   Vitals reviewed.

## 2025-07-21 LAB
HPV HR 12 DNA CVX QL NAA+PROBE: NEGATIVE
HPV16 DNA CVX QL NAA+PROBE: NEGATIVE
HPV18 DNA CVX QL NAA+PROBE: NEGATIVE

## 2025-07-23 LAB
LAB AP GYN PRIMARY INTERPRETATION: NORMAL
Lab: NORMAL

## 2025-07-28 ENCOUNTER — HOSPITAL ENCOUNTER (OUTPATIENT)
Dept: ULTRASOUND IMAGING | Facility: CLINIC | Age: 60
Discharge: HOME | End: 2025-07-28
Attending: PHYSICIAN ASSISTANT
Payer: COMMERCIAL

## 2025-07-28 ENCOUNTER — HOSPITAL ENCOUNTER (OUTPATIENT)
Dept: MAMMOGRAPHY | Facility: CLINIC | Age: 60
Discharge: HOME | End: 2025-07-28
Attending: PHYSICIAN ASSISTANT
Payer: COMMERCIAL

## 2025-07-28 VITALS — BODY MASS INDEX: 22.68 KG/M2 | HEIGHT: 63 IN | WEIGHT: 128 LBS

## 2025-07-28 DIAGNOSIS — R92.8 ABNORMAL MAMMOGRAM: ICD-10-CM

## 2025-07-28 PROCEDURE — G0279 TOMOSYNTHESIS, MAMMO: HCPCS

## 2025-07-28 PROCEDURE — 77065 DX MAMMO INCL CAD UNI: CPT

## 2025-07-28 PROCEDURE — 76642 ULTRASOUND BREAST LIMITED: CPT

## 2025-07-30 ENCOUNTER — HOSPITAL ENCOUNTER (OUTPATIENT)
Dept: RADIOLOGY | Facility: MEDICAL CENTER | Age: 60
Discharge: HOME | End: 2025-07-30
Payer: COMMERCIAL

## 2025-07-30 DIAGNOSIS — Z87.311 HISTORY OF FRAGILITY FRACTURE: ICD-10-CM

## 2025-07-30 DIAGNOSIS — Z82.62 FAMILY HISTORY OF OSTEOPOROSIS: ICD-10-CM

## 2025-07-30 DIAGNOSIS — Z13.820 ENCOUNTER FOR OSTEOPOROSIS SCREENING IN ASYMPTOMATIC POSTMENOPAUSAL PATIENT: ICD-10-CM

## 2025-07-30 DIAGNOSIS — Z78.0 ENCOUNTER FOR OSTEOPOROSIS SCREENING IN ASYMPTOMATIC POSTMENOPAUSAL PATIENT: ICD-10-CM

## 2025-07-30 PROCEDURE — 77080 DXA BONE DENSITY AXIAL: CPT

## (undated) DEVICE — IMPERVIOUS STOCKINETTE: Brand: DEROYAL

## (undated) DEVICE — GLOVE INDICATOR PI UNDERGLOVE SZ 6.5 BLUE

## (undated) DEVICE — GAUZE SPONGES,16 PLY: Brand: CURITY

## (undated) DEVICE — GLOVE SRG BIOGEL 6.5

## (undated) DEVICE — STERILE BETHLEHEM PLASTIC HAND: Brand: CARDINAL HEALTH

## (undated) DEVICE — GLOVE SRG BIOGEL 7

## (undated) DEVICE — DISPOSABLE EQUIPMENT COVER: Brand: SMALL TOWEL DRAPE

## (undated) DEVICE — SUT PROLENE 4-0 PS-2 18 IN 8682G

## (undated) DEVICE — MINI BLADE ROUND TIP SHARP ON ONE SIDE

## (undated) DEVICE — OCCLUSIVE GAUZE STRIP,3% BISMUTH TRIBROMOPHENATE IN PETROLATUM BLEND: Brand: XEROFORM

## (undated) DEVICE — CURITY STRETCH BANDAGE: Brand: CURITY

## (undated) DEVICE — INTENDED FOR TISSUE SEPARATION, AND OTHER PROCEDURES THAT REQUIRE A SHARP SURGICAL BLADE TO PUNCTURE OR CUT.: Brand: BARD-PARKER ® CARBON RIB-BACK BLADES

## (undated) DEVICE — ACE WRAP 4 IN STERILE

## (undated) DEVICE — CUFF TOURNIQUET 18 X 4 IN QUICK CONNECT DISP 1 BLADDER

## (undated) DEVICE — GLOVE INDICATOR PI UNDERGLOVE SZ 7 BLUE

## (undated) DEVICE — SPONGE PVP SCRUB WING STERILE